# Patient Record
Sex: FEMALE | Race: WHITE | Employment: FULL TIME | URBAN - METROPOLITAN AREA
[De-identification: names, ages, dates, MRNs, and addresses within clinical notes are randomized per-mention and may not be internally consistent; named-entity substitution may affect disease eponyms.]

---

## 2021-04-09 ENCOUNTER — OFFICE VISIT (OUTPATIENT)
Dept: FAMILY MEDICINE CLINIC | Age: 56
End: 2021-04-09
Payer: MEDICAID

## 2021-04-09 ENCOUNTER — TELEPHONE (OUTPATIENT)
Dept: FAMILY MEDICINE CLINIC | Age: 56
End: 2021-04-09

## 2021-04-09 VITALS
HEIGHT: 65 IN | HEART RATE: 56 BPM | RESPIRATION RATE: 20 BRPM | OXYGEN SATURATION: 97 % | BODY MASS INDEX: 31.79 KG/M2 | SYSTOLIC BLOOD PRESSURE: 112 MMHG | WEIGHT: 190.8 LBS | DIASTOLIC BLOOD PRESSURE: 60 MMHG | TEMPERATURE: 97.8 F

## 2021-04-09 DIAGNOSIS — K21.9 GASTROESOPHAGEAL REFLUX DISEASE WITHOUT ESOPHAGITIS: ICD-10-CM

## 2021-04-09 DIAGNOSIS — F41.1 GAD (GENERALIZED ANXIETY DISORDER): ICD-10-CM

## 2021-04-09 DIAGNOSIS — G56.02 LEFT CARPAL TUNNEL SYNDROME: ICD-10-CM

## 2021-04-09 DIAGNOSIS — R10.84 GENERALIZED ABDOMINAL PAIN: ICD-10-CM

## 2021-04-09 DIAGNOSIS — Z12.11 COLON CANCER SCREENING: ICD-10-CM

## 2021-04-09 DIAGNOSIS — Z13.1 DIABETES MELLITUS SCREENING: ICD-10-CM

## 2021-04-09 DIAGNOSIS — Z12.31 ENCOUNTER FOR SCREENING MAMMOGRAM FOR BREAST CANCER: Primary | ICD-10-CM

## 2021-04-09 DIAGNOSIS — Z11.59 NEED FOR HEPATITIS C SCREENING TEST: ICD-10-CM

## 2021-04-09 DIAGNOSIS — K58.8 OTHER IRRITABLE BOWEL SYNDROME: Primary | ICD-10-CM

## 2021-04-09 DIAGNOSIS — K58.9 IRRITABLE BOWEL SYNDROME WITHOUT DIARRHEA: ICD-10-CM

## 2021-04-09 DIAGNOSIS — E66.09 CLASS 1 OBESITY DUE TO EXCESS CALORIES WITH BODY MASS INDEX (BMI) OF 31.0 TO 31.9 IN ADULT, UNSPECIFIED WHETHER SERIOUS COMORBIDITY PRESENT: ICD-10-CM

## 2021-04-09 PROCEDURE — 99204 OFFICE O/P NEW MOD 45 MIN: CPT | Performed by: FAMILY MEDICINE

## 2021-04-09 PROCEDURE — 36415 COLL VENOUS BLD VENIPUNCTURE: CPT | Performed by: FAMILY MEDICINE

## 2021-04-09 RX ORDER — BUSPIRONE HYDROCHLORIDE 10 MG/1
10 TABLET ORAL 3 TIMES DAILY
Qty: 90 TAB | Refills: 2 | Status: SHIPPED | OUTPATIENT
Start: 2021-04-09 | End: 2021-12-13

## 2021-04-09 RX ORDER — DICYCLOMINE HYDROCHLORIDE 10 MG/1
10 CAPSULE ORAL 3 TIMES DAILY
Qty: 90 CAP | Refills: 5 | Status: SHIPPED | OUTPATIENT
Start: 2021-04-09 | End: 2021-12-13

## 2021-04-09 NOTE — PROGRESS NOTES
Rios Lyman is a 64 y.o. female who presents with the following:  Chief Complaint   Patient presents with    Hand Pain     numbness from carpal tunnel syndrome    Abdominal Pain    Anxiety    GERD       Patient has a past history of irritable bowel disease but recently has been having generalized abdominal pain without any constipation without any diarrhea without any fevers chills or sweats awoke without any blood in her stool. The patient has not had a colonoscopy and realizes she needs to have one. The patient does have gastro esophageal reflux without any esophagitis but she has been having more heartburn on and off as of late. She is not on any medication for this. The patient has been having tingling in her left hand when she is sleeping at night and wakes up and has to shake it to get it to stop tingling. Patient also has a long history of generalized anxiety that goes back to her teens and she has not been on any medication for this and does seem to notice that her abdominal pain is worse when she is really anxious and her current job puts a lot of stress on her. Not on File    No current outpatient medications on file. No current facility-administered medications for this visit. No past medical history on file. No past surgical history on file. Family History   Problem Relation Age of Onset    Cancer Mother     Diabetes Mother     Cancer Father     Diabetes Father        Social History     Socioeconomic History    Marital status:      Spouse name: Not on file    Number of children: Not on file    Years of education: Not on file    Highest education level: Not on file   Tobacco Use    Smoking status: Never Smoker    Smokeless tobacco: Never Used       Review of Systems   Constitutional: Positive for weight loss. Negative for chills, fever and malaise/fatigue. HENT: Negative for congestion, hearing loss, sore throat and tinnitus.     Eyes: Negative for blurred vision, pain and discharge. Respiratory: Negative for cough, shortness of breath and wheezing. Cardiovascular: Negative for chest pain, palpitations, orthopnea, claudication and leg swelling. Gastrointestinal: Positive for heartburn. Negative for abdominal pain and constipation. Genitourinary: Negative for dysuria, frequency and urgency. Musculoskeletal: Negative for falls, joint pain and myalgias. Skin: Negative for itching and rash. Neurological: Positive for tingling. Negative for dizziness, tremors and headaches. Endo/Heme/Allergies: Negative for environmental allergies and polydipsia. Psychiatric/Behavioral: Negative for depression and substance abuse. The patient is nervous/anxious. Visit Vitals  /60   Pulse (!) 56   Temp 97.8 °F (36.6 °C)   Resp 20   Ht 5' 5\" (1.651 m)   Wt 190 lb 12.8 oz (86.5 kg)   SpO2 97%   BMI 31.75 kg/m²     Physical Exam  Vitals signs reviewed. Constitutional:       General: She is not in acute distress. Appearance: Normal appearance. She is obese. She is not ill-appearing. HENT:      Head: Normocephalic and atraumatic. Right Ear: Tympanic membrane, ear canal and external ear normal.      Left Ear: Tympanic membrane, ear canal and external ear normal.      Nose: Nose normal. No congestion or rhinorrhea. Mouth/Throat:      Mouth: Mucous membranes are moist.      Pharynx: No posterior oropharyngeal erythema. Eyes:      General:         Right eye: No discharge. Left eye: No discharge. Extraocular Movements: Extraocular movements intact. Conjunctiva/sclera: Conjunctivae normal.      Pupils: Pupils are equal, round, and reactive to light. Comments: Cornea anterior chamber and iris are normal.   Neck:      Musculoskeletal: Normal range of motion and neck supple. Trachea: No tracheal deviation. Cardiovascular:      Rate and Rhythm: Normal rate and regular rhythm. Pulses: Normal pulses.       Heart sounds: Normal heart sounds. No murmur. No friction rub. No gallop. Pulmonary:      Effort: Pulmonary effort is normal. No respiratory distress. Breath sounds: Normal breath sounds. No wheezing or rhonchi. Chest:      Chest wall: No tenderness. Abdominal:      General: Bowel sounds are normal. There is no distension. Palpations: Abdomen is soft. There is no mass. Tenderness: There is no abdominal tenderness. There is no guarding or rebound. Musculoskeletal: Normal range of motion. General: No tenderness or deformity. Right lower leg: No edema. Left lower leg: No edema. Lymphadenopathy:      Cervical: No cervical adenopathy. Skin:     General: Skin is warm and dry. Coloration: Skin is not pale. Findings: No erythema or rash. Neurological:      General: No focal deficit present. Mental Status: She is alert and oriented to person, place, and time. Cranial Nerves: No cranial nerve deficit. Motor: No abnormal muscle tone. Deep Tendon Reflexes: Reflexes are normal and symmetric. Reflexes normal.      Comments: Cranial nerves II through XII are intact sensory and motor. Biceps triceps knee and ankle DTRs are normal and symmetrical.   Psychiatric:         Behavior: Behavior normal.         Thought Content: Thought content normal.         Judgment: Judgment normal.      Comments: Patient's mood is somber. ICD-10-CM ICD-9-CM    1. Encounter for screening mammogram for breast cancer  Z12.31 V76.12 [facility] 3D ABISAI W MAMMO BI SCREENING INCL CAD   2. Colon cancer screening  Z12.11 V76.51 REFERRAL TO GENERAL SURGERY   3. KEY (generalized anxiety disorder)  F41.1 300.02    4. Left carpal tunnel syndrome  G56.02 354.0    5. Generalized abdominal pain  R10.84 789.07 CBC WITH AUTOMATED DIFF      METABOLIC PANEL, COMPREHENSIVE      LIPID PANEL      SED RATE (ESR)   6. Diabetes mellitus screening  Z13.1 V77.1    7.  Gastroesophageal reflux disease without esophagitis  K21.9 530.81    8. Irritable bowel syndrome without diarrhea  K58.9 564.1    9. Class 1 obesity due to excess calories with body mass index (BMI) of 31.0 to 31.9 in adult, unspecified whether serious comorbidity present  E66.09 278.00     Z68.31 V85.31        Orders Placed This Encounter    [facility] 3D ABISAI W MAMMO BI SCREENING INCL CAD     Standing Status:   Future     Standing Expiration Date:   7/9/2021    CBC WITH AUTOMATED DIFF     Standing Status:   Future     Standing Expiration Date:   7/3/3872    METABOLIC PANEL, COMPREHENSIVE     Standing Status:   Future     Standing Expiration Date:   5/9/2021    LIPID PANEL     Standing Status:   Future     Standing Expiration Date:   5/9/2021    SED RATE (ESR)     Standing Status:   Future     Standing Expiration Date:   4/9/2022   [facility] General [facility]     Referral Priority:   Routine     Referral Type:   Consultation     Referral Reason:   Specialty Services Required     Referred to Provider:   Harrison Jones MD     Number of Visits Requested:   1       Follow-up and Dispositions    · Return in about 4 weeks (around 5/7/2021), or if symptoms worsen or fail to improve.          Imani Roa MD

## 2021-04-09 NOTE — TELEPHONE ENCOUNTER
Patient is wanting to know what meds were prescribed for her today but I do not see that anything was prescribed.   Please confirm

## 2021-04-09 NOTE — TELEPHONE ENCOUNTER
----- Message from John Guerrero sent at 4/9/2021  3:19 PM EDT -----  Regarding: Dr Vidales/Telephone  General Message/Vendor Calls    Caller's first and last name: N/A      Reason for call: Pt would like to confirm what medications she was prescribed at her appt today      Callback required yes/no and why: yes, to confirm what medications she was prescribed at her appt today so she can pick them up      Best contact number(s): [phone]      Details to clarify the request: Pt would like to confirm what medications she was prescribed at her appt today 4/9 so she can pick them up from the pharmacy.  Pt would like a call back confirmation today so she can know when she can  her medication      John Guerrero

## 2021-04-09 NOTE — PROGRESS NOTES
1. Have you been to the ER, urgent care clinic since your last visit? Hospitalized since your last visit?no    2. Have you seen or consulted any other health care providers outside of the [address] [place] [address] since your last visit? Include any pap smears or colon screening.  no

## 2021-04-10 LAB
ALBUMIN SERPL-MCNC: 4 G/DL (ref 3.5–5)
ALBUMIN/GLOB SERPL: 1.1 {RATIO} (ref 1.1–2.2)
ALP SERPL-CCNC: 132 U/L (ref 45–117)
ALT SERPL-CCNC: 27 U/L (ref 12–78)
ANION GAP SERPL CALC-SCNC: 3 MMOL/L (ref 5–15)
AST SERPL-CCNC: 13 U/L (ref 15–37)
BASOPHILS # BLD: 0.1 K/UL (ref 0–0.1)
BASOPHILS NFR BLD: 1 % (ref 0–1)
BILIRUB SERPL-MCNC: 0.5 MG/DL (ref 0.2–1)
BUN SERPL-MCNC: 19 MG/DL (ref 6–20)
BUN/CREAT SERPL: 22 (ref 12–20)
CALCIUM SERPL-MCNC: 9.7 MG/DL (ref 8.5–10.1)
CHLORIDE SERPL-SCNC: 108 MMOL/L (ref 97–108)
CHOLEST SERPL-MCNC: 240 MG/DL
CO2 SERPL-SCNC: 30 MMOL/L (ref 21–32)
CREAT SERPL-MCNC: 0.88 MG/DL (ref 0.55–1.02)
DIFFERENTIAL METHOD BLD: ABNORMAL
EOSINOPHIL # BLD: 0.1 K/UL (ref 0–0.4)
EOSINOPHIL NFR BLD: 1 % (ref 0–7)
ERYTHROCYTE [DISTWIDTH] IN BLOOD BY AUTOMATED COUNT: 12.2 % (ref 11.5–14.5)
ERYTHROCYTE [SEDIMENTATION RATE] IN BLOOD: 24 MM/HR (ref 0–30)
GLOBULIN SER CALC-MCNC: 3.7 G/DL (ref 2–4)
GLUCOSE SERPL-MCNC: 92 MG/DL (ref 65–100)
HCT VFR BLD AUTO: 48.3 % (ref 35–47)
HDLC SERPL-MCNC: 47 MG/DL
HDLC SERPL: 5.1 {RATIO} (ref 0–5)
HGB BLD-MCNC: 15.5 G/DL (ref 11.5–16)
IMM GRANULOCYTES # BLD AUTO: 0 K/UL (ref 0–0.04)
IMM GRANULOCYTES NFR BLD AUTO: 0 % (ref 0–0.5)
LDLC SERPL CALC-MCNC: 158.6 MG/DL (ref 0–100)
LIPID PROFILE,FLP: ABNORMAL
LYMPHOCYTES # BLD: 1.5 K/UL (ref 0.8–3.5)
LYMPHOCYTES NFR BLD: 14 % (ref 12–49)
MCH RBC QN AUTO: 30.9 PG (ref 26–34)
MCHC RBC AUTO-ENTMCNC: 32.1 G/DL (ref 30–36.5)
MCV RBC AUTO: 96.2 FL (ref 80–99)
MONOCYTES # BLD: 0.4 K/UL (ref 0–1)
MONOCYTES NFR BLD: 4 % (ref 5–13)
NEUTS SEG # BLD: 8.9 K/UL (ref 1.8–8)
NEUTS SEG NFR BLD: 80 % (ref 32–75)
NRBC # BLD: 0 K/UL (ref 0–0.01)
NRBC BLD-RTO: 0 PER 100 WBC
PLATELET # BLD AUTO: 238 K/UL (ref 150–400)
PMV BLD AUTO: 11.6 FL (ref 8.9–12.9)
POTASSIUM SERPL-SCNC: 5 MMOL/L (ref 3.5–5.1)
PROT SERPL-MCNC: 7.7 G/DL (ref 6.4–8.2)
RBC # BLD AUTO: 5.02 M/UL (ref 3.8–5.2)
SODIUM SERPL-SCNC: 141 MMOL/L (ref 136–145)
TRIGL SERPL-MCNC: 172 MG/DL (ref ?–150)
VLDLC SERPL CALC-MCNC: 34.4 MG/DL
WBC # BLD AUTO: 11 K/UL (ref 3.6–11)

## 2021-04-12 LAB
HCV AB SERPL QL IA: NONREACTIVE
HCV COMMENT,HCGAC: NORMAL

## 2021-04-27 ENCOUNTER — OFFICE VISIT (OUTPATIENT)
Dept: FAMILY MEDICINE CLINIC | Age: 56
End: 2021-04-27
Payer: MEDICAID

## 2021-04-27 ENCOUNTER — HOSPITAL ENCOUNTER (OUTPATIENT)
Dept: LAB | Age: 56
Discharge: HOME | End: 2021-04-27

## 2021-04-27 VITALS
BODY MASS INDEX: 31.05 KG/M2 | HEIGHT: 65 IN | HEART RATE: 55 BPM | WEIGHT: 186.38 LBS | RESPIRATION RATE: 18 BRPM | OXYGEN SATURATION: 98 % | DIASTOLIC BLOOD PRESSURE: 52 MMHG | SYSTOLIC BLOOD PRESSURE: 102 MMHG

## 2021-04-27 DIAGNOSIS — Z01.419 ENCOUNTER FOR ANNUAL ROUTINE GYNECOLOGICAL EXAMINATION: Primary | ICD-10-CM

## 2021-04-27 PROCEDURE — 99396 PREV VISIT EST AGE 40-64: CPT | Performed by: FAMILY MEDICINE

## 2021-04-27 NOTE — PROGRESS NOTES
1. Have you been to the ER, urgent care clinic since your last visit? Hospitalized since your last visit? No    2. Have you seen or consulted any other health care providers outside of the [address] [place] [address] since your last visit? Include any pap smears or colon screening.  No     Chief Complaint   Patient presents with    Gyn Exam     Visit Vitals  BP (!) 102/52 (BP 1 Location: Left arm, BP Patient Position: Sitting)   Pulse (!) 55   Resp 18   Ht 5' 5\" (1.651 m)   Wt 186 lb 6 oz (84.5 kg)   LMP  (LMP Unknown)   SpO2 98%   BMI 31.01 kg/m²

## 2021-04-27 NOTE — PROGRESS NOTES
Vanesa Coker is a 64 y.o. female who presents with the following:  Chief Complaint   Patient presents with   [place] Exam       Patient is in today for well woman examination and is not complaining of any particular problems right now. He states that her irritable bowel is doing much better on the dicyclomine and her anxiety is much better on the buspirone. She has a second Covid shot today and has a colonoscopy lined up and her mammogram lined up. Allergies   Allergen Reactions    Other Medication Unknown (comments)     Horse serum    Sulfa (Sulfonamide Antibiotics) Other (comments)     Patient states felt \"very sick and had high fever\"       Current Outpatient Medications   Medication Sig    dicyclomine (BENTYL) 10 mg capsule Take 1 Cap by mouth three (3) times daily. Indications: irritable colon    busPIRone (BUSPAR) 10 mg tablet Take 1 Tab by mouth three (3) times daily. No current facility-administered medications for this visit. No past medical history on file. No past surgical history on file. Family History   Problem Relation Age of Onset    Cancer Mother     Diabetes Mother     Cancer Father     Diabetes Father        Social History     Socioeconomic History    Marital status:      Spouse name: Not on file    Number of children: Not on file    Years of education: Not on file    Highest education level: Not on file   Tobacco Use    Smoking status: Never Smoker    Smokeless tobacco: Never Used       Review of Systems   Constitutional: Negative for chills, fever, malaise/fatigue and weight loss. HENT: Negative for congestion, hearing loss, sore throat and tinnitus. Eyes: Negative for blurred vision, pain and discharge. Respiratory: Negative for cough, shortness of breath and wheezing. Cardiovascular: Negative for chest pain, palpitations, orthopnea, claudication and leg swelling. Gastrointestinal: Negative for abdominal pain, constipation and heartburn. Genitourinary: Negative for dysuria, frequency and urgency. Musculoskeletal: Negative for falls, joint pain and myalgias. Skin: Negative for itching and rash. Neurological: Negative for dizziness, tingling, tremors and headaches. Endo/Heme/Allergies: Negative for environmental allergies and polydipsia. Psychiatric/Behavioral: Negative for depression and substance abuse. The patient is not nervous/anxious. Visit Vitals  BP (!) 102/52 (BP 1 Location: Left arm, BP Patient Position: Sitting)   Pulse (!) 55   Resp 18   Ht 5' 5\" (1.651 m)   Wt 186 lb 6 oz (84.5 kg)   LMP  (LMP Unknown)   SpO2 98%   BMI 31.01 kg/m²     Physical Exam  Vitals signs reviewed. Exam conducted with a chaperone present. Constitutional:       General: She is not in acute distress. Appearance: Normal appearance. She is obese. She is not ill-appearing. HENT:      Head: Normocephalic and atraumatic. Right Ear: Tympanic membrane, ear canal and external ear normal.      Left Ear: Tympanic membrane, ear canal and external ear normal.      Nose: Nose normal. No congestion or rhinorrhea. Mouth/Throat:      Mouth: Mucous membranes are moist.      Pharynx: No posterior oropharyngeal erythema. Eyes:      General:         Right eye: No discharge. Left eye: No discharge. Extraocular Movements: Extraocular movements intact. Conjunctiva/sclera: Conjunctivae normal.      Pupils: Pupils are equal, round, and reactive to light. Comments: Cornea anterior chamber and iris are normal.   Neck:      Musculoskeletal: Normal range of motion and neck supple. Trachea: No tracheal deviation. Cardiovascular:      Rate and Rhythm: Normal rate and regular rhythm. Pulses: Normal pulses. Heart sounds: Normal heart sounds. No murmur. No friction rub. No gallop. Pulmonary:      Effort: Pulmonary effort is normal. No respiratory distress. Breath sounds: Normal breath sounds.  No wheezing or rhonchi. Chest:      Chest wall: No tenderness. Abdominal:      General: Bowel sounds are normal. There is no distension. Palpations: Abdomen is soft. There is no mass. Tenderness: There is no abdominal tenderness. There is no guarding or rebound. Genitourinary:     General: Normal vulva. Vagina: No vaginal discharge. Rectum: Normal.      Comments: Patient's external genitalia were normal with a small inclusion cyst on the left labia which is asymptomatic. Patient's introitus was normal bladder urethra [place]'s were normal patient's vagina was normal and the cervix appeared to be normal and a Pap smear was completed and the uterus was found to be normal size anterior adnexa were free from any masses. Rectovaginal exam was normal the anus and rectum were normal.  Patient did have an anterior sentinel tag. The patient's breast examinations were normal bilaterally there were no masses no tenderness and no discharge and lymph nodes in the axillary area in the supraclavicular and cervical areas were normal as well as is the inguinal lymph nodes were normal.  Musculoskeletal:         General: No tenderness or deformity. Right lower leg: No edema. Left lower leg: No edema. Lymphadenopathy:      Cervical: No cervical adenopathy. Skin:     General: Skin is warm and dry. Coloration: Skin is not pale. Findings: No erythema or rash. Neurological:      General: No focal deficit present. Mental Status: She is alert and oriented to person, place, and time. Cranial Nerves: No cranial nerve deficit. Motor: No abnormal muscle tone. Deep Tendon Reflexes: Reflexes are normal and symmetric. Reflexes normal.      Comments: Cranial nerves II through XII are intact sensory and motor. Biceps triceps knee and ankle DTRs are normal and symmetrical.   Psychiatric:         Mood and Affect: Mood normal.         Behavior: Behavior normal.         Thought Content:  Thought content normal.         Judgment: Judgment normal.           ICD-10-CM ICD-9-CM    1. Encounter for annual routine gynecological examination  Z01.419 V72.31 PAP (IMAGE GUIDED) AND CHLAMYDIA/GC       Orders Placed This Encounter    PAP (IMAGE GUIDED) AND CHLAMYDIA/GC     Standing Status:   Future     Standing Expiration Date:   4/27/2022     Order Specific Question:   Pap Source? Answer:   Cervical     Order Specific Question:   Total Hysterectomy? Answer:   No     Order Specific Question:   Supracervical Hysterectomy? Answer:   No     Order Specific Question:   Post Menopausal?     Answer:   Yes     Order Specific Question:   Hormone Therapy? Answer:   No     Order Specific Question:   IUD? Answer:   No     Order Specific Question:   Abnormal Bleeding? Answer:   No     Order Specific Question:   Pregnant     Answer:   No     Order Specific Question:   Post Partum? Answer:   No     Order Specific Question:   Pap collection method? Answer:   broom       Follow-up and Dispositions    · Return in about 3 years (around 4/27/2024).          Chriss Dorantes MD

## 2021-05-13 ENCOUNTER — OFFICE VISIT (OUTPATIENT)
Dept: FAMILY MEDICINE CLINIC | Age: 56
End: 2021-05-13
Payer: MEDICAID

## 2021-05-13 VITALS
DIASTOLIC BLOOD PRESSURE: 72 MMHG | WEIGHT: 187.5 LBS | HEIGHT: 65 IN | BODY MASS INDEX: 31.24 KG/M2 | RESPIRATION RATE: 18 BRPM | TEMPERATURE: 98.3 F | HEART RATE: 59 BPM | SYSTOLIC BLOOD PRESSURE: 133 MMHG | OXYGEN SATURATION: 97 %

## 2021-05-13 DIAGNOSIS — S46.811A INFRASPINATUS STRAIN, RIGHT, INITIAL ENCOUNTER: Primary | ICD-10-CM

## 2021-05-13 DIAGNOSIS — T88.7XXA MEDICATION SIDE EFFECT: ICD-10-CM

## 2021-05-13 PROCEDURE — 99213 OFFICE O/P EST LOW 20 MIN: CPT | Performed by: FAMILY MEDICINE

## 2021-05-13 RX ORDER — NAPROXEN 500 MG/1
500 TABLET ORAL 2 TIMES DAILY WITH MEALS
Qty: 60 TAB | Refills: 1 | Status: SHIPPED | OUTPATIENT
Start: 2021-05-13 | End: 2021-08-23 | Stop reason: SDUPTHER

## 2021-05-13 RX ORDER — CHLORZOXAZONE 500 MG/1
500 TABLET ORAL
Qty: 60 TAB | Refills: 2 | Status: SHIPPED | OUTPATIENT
Start: 2021-05-13 | End: 2021-08-13

## 2021-05-13 RX ORDER — OMEPRAZOLE 40 MG/1
40 CAPSULE, DELAYED RELEASE ORAL DAILY
Qty: 30 CAP | Refills: 1 | Status: SHIPPED | OUTPATIENT
Start: 2021-05-13 | End: 2021-08-13

## 2021-05-13 NOTE — PROGRESS NOTES
1. Have you been to the ER, urgent care clinic since your last visit? Hospitalized since your last visit? No    2. Have you seen or consulted any other health care providers outside of the [address] [place] [address] since your last visit? Include any pap smears or colon screening.  No     Chief Complaint   Patient presents with    Shoulder Pain     Visit Vitals  /72 (BP 1 Location: Left arm, BP Patient Position: Sitting)   Pulse (!) 59   Temp 98.3 °F (36.8 °C) (Temporal)   Resp 18   Ht 5' 5\" (1.651 m)   Wt 187 lb 8 oz (85 kg)   LMP  (LMP Unknown)   SpO2 97%   BMI 31.20 kg/m²

## 2021-05-13 NOTE — PROGRESS NOTES
Belkis Mora is a 64 y.o. female who presents with the following:  Chief Complaint   Patient presents with    Shoulder Pain       Patient has a 2-week history of pain in the vicinity of the right infraspinatus muscle. Patient does a great deal of repetitive work at her employment. Allergies   Allergen Reactions    Other Medication Unknown (comments)     Horse serum    Sulfa (Sulfonamide Antibiotics) Other (comments)     Patient states felt \"very sick and had high fever\"       Current Outpatient Medications   Medication Sig    naproxen (NAPROSYN) 500 mg tablet Take 1 Tab by mouth two (2) times daily (with meals).  chlorzoxazone (PARAFON FORTE) 500 mg tablet Take 1 Tab by mouth four (4) times daily as needed for Muscle Spasm(s).  omeprazole (PRILOSEC) 40 mg capsule Take 1 Cap by mouth daily.  dicyclomine (BENTYL) 10 mg capsule Take 1 Cap by mouth three (3) times daily. Indications: irritable colon    busPIRone (BUSPAR) 10 mg tablet Take 1 Tab by mouth three (3) times daily. No current facility-administered medications for this visit. History reviewed. No pertinent past medical history. History reviewed. No pertinent surgical history. Family History   Problem Relation Age of Onset    Cancer Mother     Diabetes Mother     Cancer Father     Diabetes Father        Social History     Socioeconomic History    Marital status:      Spouse name: Not on file    Number of children: Not on file    Years of education: Not on file    Highest education level: Not on file   Tobacco Use    Smoking status: Never Smoker    Smokeless tobacco: Never Used       Review of Systems   Constitutional: Negative for chills, fever, malaise/fatigue and weight loss. HENT: Negative for congestion, hearing loss, sore throat and tinnitus. Eyes: Negative for blurred vision, pain and discharge. Respiratory: Negative for cough, shortness of breath and wheezing.     Cardiovascular: Negative for chest pain, palpitations, orthopnea, claudication and leg swelling. Gastrointestinal: Negative for abdominal pain, constipation and heartburn. Her gait is a bit upset today when she tried a new nondairy product and she thinks she overdid it. Genitourinary: Negative for dysuria, frequency and urgency. Musculoskeletal: Positive for back pain (In the right shoulder girdle). Negative for falls, joint pain and myalgias. Skin: Negative for itching and rash. Neurological: Negative for dizziness, tingling, tremors and headaches. Endo/Heme/Allergies: Negative for environmental allergies and polydipsia. Psychiatric/Behavioral: Negative for depression and substance abuse. The patient is not nervous/anxious. Visit Vitals  /72 (BP 1 Location: Left arm, BP Patient Position: Sitting)   Pulse (!) 59   Temp 98.3 °F (36.8 °C) (Temporal)   Resp 18   Ht 5' 5\" (1.651 m)   Wt 187 lb 8 oz (85 kg)   LMP  (LMP Unknown)   SpO2 97%   BMI 31.20 kg/m²     Physical Exam  Constitutional:       Appearance: Normal appearance. HENT:      Head: Normocephalic and atraumatic. Right Ear: Tympanic membrane, ear canal and external ear normal.      Left Ear: Tympanic membrane, ear canal and external ear normal.      Nose: Nose normal. No congestion or rhinorrhea. Mouth/Throat:      Mouth: Mucous membranes are moist.      Pharynx: No posterior oropharyngeal erythema. Eyes:      General:         Right eye: No discharge. Left eye: No discharge. Extraocular Movements: Extraocular movements intact. Conjunctiva/sclera: Conjunctivae normal.      Pupils: Pupils are equal, round, and reactive to light. Comments: Cornea anterior chamber and iris are normal.   Neck:      Musculoskeletal: Normal range of motion and neck supple. Trachea: No tracheal deviation. Cardiovascular:      Rate and Rhythm: Normal rate and regular rhythm. Pulses: Normal pulses.       Heart sounds: Normal heart sounds. No murmur. No friction rub. No gallop. Pulmonary:      Effort: Pulmonary effort is normal. No respiratory distress. Breath sounds: Normal breath sounds. No wheezing or rhonchi. Chest:      Chest wall: No tenderness. Abdominal:      General: Bowel sounds are normal. There is no distension. Palpations: Abdomen is soft. There is no mass. Tenderness: There is no abdominal tenderness. There is no guarding or rebound. Musculoskeletal: Normal range of motion. General: Tenderness (Tenderness and some muscle spasm palpable in the right infraspinatus muscle) present. No deformity. Lymphadenopathy:      Cervical: No cervical adenopathy. Skin:     General: Skin is warm and dry. Coloration: Skin is not pale. Findings: No erythema or rash. Neurological:      General: No focal deficit present. Mental Status: She is alert and oriented to person, place, and time. Cranial Nerves: No cranial nerve deficit. Motor: No abnormal muscle tone. Deep Tendon Reflexes: Reflexes are normal and symmetric. Reflexes normal.      Comments: Cranial nerves II through XII are intact sensory and motor. Biceps triceps knee and ankle DTRs are normal and symmetrical.   Psychiatric:         Mood and Affect: Mood normal.         Behavior: Behavior normal.           ICD-10-CM ICD-9-CM    1. Infraspinatus strain, right, initial encounter  S46.811A 840.3 naproxen (NAPROSYN) 500 mg tablet      chlorzoxazone (PARAFON FORTE) 500 mg tablet   2. Medication side effect  T88. 7XXA 995.20 omeprazole (PRILOSEC) 40 mg capsule       Orders Placed This Encounter    naproxen (NAPROSYN) 500 mg tablet     Sig: Take 1 Tab by mouth two (2) times daily (with meals). Dispense:  60 Tab     Refill:  1    chlorzoxazone (PARAFON FORTE) 500 mg tablet     Sig: Take 1 Tab by mouth four (4) times daily as needed for Muscle Spasm(s).      Dispense:  60 Tab     Refill:  2    omeprazole (PRILOSEC) 40 mg capsule     Sig: Take 1 Cap by mouth daily. Dispense:  30 Cap     Refill:  1       Follow-up and Dispositions    · Return if symptoms worsen or fail to improve. Consider physical therapy if medications do not resolve this in a reasonable amount of time.     Allie Allan MD

## 2021-06-01 ENCOUNTER — HOSPITAL ENCOUNTER (OUTPATIENT)
Dept: MAMMOGRAPHY | Age: 56
Discharge: HOME | End: 2021-06-01
Attending: FAMILY MEDICINE
Payer: MEDICAID

## 2021-06-01 DIAGNOSIS — Z12.31 ENCOUNTER FOR SCREENING MAMMOGRAM FOR BREAST CANCER: ICD-10-CM

## 2021-06-01 DIAGNOSIS — R92.8 ABNORMALITY OF LEFT BREAST ON SCREENING MAMMOGRAM: Primary | ICD-10-CM

## 2021-06-01 PROCEDURE — 77063 BREAST TOMOSYNTHESIS BI: CPT

## 2021-06-02 DIAGNOSIS — R92.8 ABNORMAL MAMMOGRAM: Primary | ICD-10-CM

## 2021-06-09 ENCOUNTER — HOSPITAL ENCOUNTER (OUTPATIENT)
Dept: ULTRASOUND IMAGING | Age: 56
Discharge: HOME | End: 2021-06-09
Attending: FAMILY MEDICINE
Payer: MEDICAID

## 2021-06-09 ENCOUNTER — HOSPITAL ENCOUNTER (OUTPATIENT)
Dept: MAMMOGRAPHY | Age: 56
Discharge: HOME | End: 2021-06-09
Attending: FAMILY MEDICINE
Payer: MEDICAID

## 2021-06-09 DIAGNOSIS — R92.8 ABNORMAL MAMMOGRAM: ICD-10-CM

## 2021-06-09 DIAGNOSIS — R92.8 ABNORMALITY OF LEFT BREAST ON SCREENING MAMMOGRAM: ICD-10-CM

## 2021-06-09 PROCEDURE — 77065 DX MAMMO INCL CAD UNI: CPT

## 2021-07-01 ENCOUNTER — OFFICE VISIT (OUTPATIENT)
Dept: FAMILY MEDICINE CLINIC | Age: 56
End: 2021-07-01

## 2021-07-01 ENCOUNTER — OFFICE VISIT (OUTPATIENT)
Dept: SURGERY | Age: 56
End: 2021-07-01

## 2021-07-01 VITALS
BODY MASS INDEX: 32.22 KG/M2 | HEART RATE: 60 BPM | WEIGHT: 193.4 LBS | DIASTOLIC BLOOD PRESSURE: 55 MMHG | HEIGHT: 65 IN | SYSTOLIC BLOOD PRESSURE: 109 MMHG | OXYGEN SATURATION: 98 % | RESPIRATION RATE: 18 BRPM

## 2021-07-01 VITALS
HEIGHT: 65 IN | TEMPERATURE: 98.6 F | OXYGEN SATURATION: 98 % | WEIGHT: 193.4 LBS | RESPIRATION RATE: 18 BRPM | SYSTOLIC BLOOD PRESSURE: 119 MMHG | BODY MASS INDEX: 32.22 KG/M2 | HEART RATE: 58 BPM | DIASTOLIC BLOOD PRESSURE: 79 MMHG

## 2021-07-01 DIAGNOSIS — R42 VERTIGO: Primary | ICD-10-CM

## 2021-07-01 DIAGNOSIS — Z12.11 ENCOUNTER FOR SCREENING COLONOSCOPY: Primary | ICD-10-CM

## 2021-07-01 PROCEDURE — 99213 OFFICE O/P EST LOW 20 MIN: CPT | Performed by: FAMILY MEDICINE

## 2021-07-01 RX ORDER — MECLIZINE HYDROCHLORIDE 25 MG/1
25 TABLET ORAL
Qty: 90 TABLET | Refills: 5 | Status: SHIPPED | OUTPATIENT
Start: 2021-07-01 | End: 2021-07-11

## 2021-07-01 RX ORDER — FLUTICASONE PROPIONATE 50 MCG
SPRAY, SUSPENSION (ML) NASAL
Qty: 1 BOTTLE | Refills: 5 | Status: SHIPPED | OUTPATIENT
Start: 2021-07-01 | End: 2021-12-15 | Stop reason: ALTCHOICE

## 2021-07-01 NOTE — PROGRESS NOTES
Sebastien Iverson is a 64 y.o. female who presents with the following:  Chief Complaint   Patient presents with    Immunization/Injection    Dizziness     with nausea       Patient would like to have a Shingrix vaccine and I told her she could get it at her pharmacy because of her insurance. Patient also has been having trouble with dizziness with nausea and she describes it as being unstable as far as her balance and it feels like the room is moving. Allergies   Allergen Reactions    Other Medication Unknown (comments)     Horse serum    Sulfa (Sulfonamide Antibiotics) Other (comments)     Patient states felt \"very sick and had high fever\"       Current Outpatient Medications   Medication Sig    meclizine (ANTIVERT) 25 mg tablet Take 1 Tablet by mouth three (3) times daily as needed for Dizziness for up to 10 days. Indications: sensation of spinning or whirling    fluticasone propionate (FLONASE) 50 mcg/actuation nasal spray 1 puff twice daily each nostril    naproxen (NAPROSYN) 500 mg tablet Take 1 Tab by mouth two (2) times daily (with meals).  chlorzoxazone (PARAFON FORTE) 500 mg tablet Take 1 Tab by mouth four (4) times daily as needed for Muscle Spasm(s).  omeprazole (PRILOSEC) 40 mg capsule Take 1 Cap by mouth daily.  dicyclomine (BENTYL) 10 mg capsule Take 1 Cap by mouth three (3) times daily. Indications: irritable colon    busPIRone (BUSPAR) 10 mg tablet Take 1 Tab by mouth three (3) times daily. No current facility-administered medications for this visit. No past medical history on file. No past surgical history on file.     Family History   Problem Relation Age of Onset    Cancer Mother     Diabetes Mother     Cancer Father     Diabetes Father     Breast Cancer Sister        Social History     Socioeconomic History    Marital status:      Spouse name: Not on file    Number of children: Not on file    Years of education: Not on file    Highest education level: Not on file   Tobacco Use    Smoking status: Never Smoker    Smokeless tobacco: Never Used     Social Determinants of Health     Financial Resource Strain:     Difficulty of Paying Living Expenses:    Food Insecurity:     Worried About Running Out of Food in the Last Year:     Ran Out of Food in the Last Year:    Transportation Needs:     Lack of Transportation (Medical):  Lack of Transportation (Non-Medical):    Physical Activity:     Days of Exercise per Week:     Minutes of Exercise per Session:    Stress:     Feeling of Stress :    Social Connections:     Frequency of Communication with Friends and Family:     Frequency of Social Gatherings with Friends and Family:     Attends Scientologist Services:     Active Member of Clubs or Organizations:     Attends Club or Organization Meetings:     Marital Status:        Review of Systems   Constitutional: Negative for chills, fever, malaise/fatigue and weight loss. HENT: Negative for congestion, hearing loss, sore throat and tinnitus. Eyes: Negative for blurred vision, pain and discharge. Respiratory: Negative for cough, shortness of breath and wheezing. Cardiovascular: Negative for chest pain, palpitations, orthopnea, claudication and leg swelling. Gastrointestinal: Positive for nausea. Negative for abdominal pain, constipation and heartburn. Genitourinary: Negative for dysuria, frequency and urgency. Musculoskeletal: Negative for falls, joint pain and myalgias. Skin: Negative for itching and rash. Neurological: Positive for dizziness. Negative for tingling, tremors and headaches. Endo/Heme/Allergies: Negative for environmental allergies and polydipsia. Psychiatric/Behavioral: Negative for depression and substance abuse. The patient is not nervous/anxious.         Visit Vitals  /79 (BP 1 Location: Left arm)   Pulse (!) 58   Temp 98.6 °F (37 °C)   Resp 18   Ht 5' 5\" (1.651 m)   Wt 193 lb 6.4 oz (87.7 kg)   SpO2 98% BMI 32.18 kg/m²     Physical Exam  Vitals reviewed. Constitutional:       General: She is not in acute distress. Appearance: Normal appearance. She is obese. She is not ill-appearing. HENT:      Head: Normocephalic and atraumatic. Right Ear: Tympanic membrane, ear canal and external ear normal.      Left Ear: Tympanic membrane, ear canal and external ear normal.      Nose: Nose normal. No congestion or rhinorrhea. Mouth/Throat:      Mouth: Mucous membranes are moist.      Pharynx: No posterior oropharyngeal erythema. Eyes:      General:         Right eye: No discharge. Left eye: No discharge. Extraocular Movements: Extraocular movements intact. Conjunctiva/sclera: Conjunctivae normal.      Pupils: Pupils are equal, round, and reactive to light. Comments: Cornea anterior chamber and iris are normal.   Neck:      Trachea: No tracheal deviation. Cardiovascular:      Rate and Rhythm: Normal rate and regular rhythm. Pulses: Normal pulses. Heart sounds: Normal heart sounds. No murmur heard. No friction rub. No gallop. Pulmonary:      Effort: Pulmonary effort is normal. No respiratory distress. Breath sounds: Normal breath sounds. No wheezing or rhonchi. Chest:      Chest wall: No tenderness. Abdominal:      General: Bowel sounds are normal. There is no distension. Palpations: Abdomen is soft. There is no mass. Tenderness: There is no abdominal tenderness. There is no guarding or rebound. Musculoskeletal:         General: No tenderness or deformity. Cervical back: Normal range of motion and neck supple. Lymphadenopathy:      Cervical: No cervical adenopathy. Skin:     General: Skin is warm and dry. Coloration: Skin is not pale. Findings: No erythema or rash. Neurological:      General: No focal deficit present. Mental Status: She is alert and oriented to person, place, and time.       Cranial Nerves: No cranial nerve deficit. Motor: No abnormal muscle tone. Deep Tendon Reflexes: Reflexes are normal and symmetric. Reflexes normal.      Comments: Cranial nerves II through XII are intact sensory and motor. Biceps triceps knee and ankle DTRs are normal and symmetrical.   Psychiatric:         Mood and Affect: Mood normal.         Behavior: Behavior normal.         Thought Content: Thought content normal.         Judgment: Judgment normal.           ICD-10-CM ICD-9-CM    1. Vertigo  R42 780.4 meclizine (ANTIVERT) 25 mg tablet      fluticasone propionate (FLONASE) 50 mcg/actuation nasal spray       Orders Placed This Encounter    meclizine (ANTIVERT) 25 mg tablet     Sig: Take 1 Tablet by mouth three (3) times daily as needed for Dizziness for up to 10 days.  Indications: sensation of spinning or whirling     Dispense:  90 Tablet     Refill:  5    fluticasone propionate (FLONASE) 50 mcg/actuation nasal spray     Si puff twice daily each nostril     Dispense:  1 Bottle     Refill:  5   Avoid salt        Ana Moraes MD

## 2021-07-01 NOTE — PROGRESS NOTES
Subjective:      Jesus Back is an 64 y.o. female who is referred by:  Caitlin Rudolph MD for evaluation for colonoscopy. Indication: screening exam.  Patient has complaint of diarrhea for many years. She believes it started 10-20 years ago. She has between 3-4 non-formed bowel movements per day. It is accompanied at times with non-specific abdominal discomfort. She denies issues with constipation, blood per rectum, pain with bowel movements or weight loss. In-addition to the diarrhea she has been treated on and off for reflux without esophagitis. She denies any prior abdominal surgery, family history of colon ca or IBD. HPI     Patient Active Problem List   Diagnosis Code    Irritable bowel syndrome without diarrhea K58.9    Gastroesophageal reflux disease without esophagitis K21.9    Generalized abdominal pain R10.84    Left carpal tunnel syndrome G56.02    KEY (generalized anxiety disorder) F41.1    Class 1 obesity due to excess calories with body mass index (BMI) of 31.0 to 31.9 in adult E66.09, Z68.31     Patient Active Problem List    Diagnosis Date Noted    Irritable bowel syndrome without diarrhea 04/09/2021    Gastroesophageal reflux disease without esophagitis 04/09/2021    Generalized abdominal pain 04/09/2021    Left carpal tunnel syndrome 04/09/2021    KEY (generalized anxiety disorder) 04/09/2021    Class 1 obesity due to excess calories with body mass index (BMI) of 31.0 to 31.9 in adult 04/09/2021     Current Outpatient Medications   Medication Sig Dispense Refill    meclizine (ANTIVERT) 25 mg tablet Take 1 Tablet by mouth three (3) times daily as needed for Dizziness for up to 10 days. Indications: sensation of spinning or whirling 90 Tablet 5    fluticasone propionate (FLONASE) 50 mcg/actuation nasal spray 1 puff twice daily each nostril 1 Bottle 5    naproxen (NAPROSYN) 500 mg tablet Take 1 Tab by mouth two (2) times daily (with meals).  60 Tab 1    chlorzoxazone (PARAFON FORTE) 500 mg tablet Take 1 Tab by mouth four (4) times daily as needed for Muscle Spasm(s). 60 Tab 2    omeprazole (PRILOSEC) 40 mg capsule Take 1 Cap by mouth daily. 30 Cap 1    dicyclomine (BENTYL) 10 mg capsule Take 1 Cap by mouth three (3) times daily. Indications: irritable colon 90 Cap 5    busPIRone (BUSPAR) 10 mg tablet Take 1 Tab by mouth three (3) times daily. 90 Tab 2     Allergies   Allergen Reactions    Other Medication Unknown (comments)     Horse serum    Sulfa (Sulfonamide Antibiotics) Other (comments)     Patient states felt \"very sick and had high fever\"     History reviewed. No pertinent past medical history. History reviewed. No pertinent surgical history. Family History   Problem Relation Age of Onset    Cancer Mother     Diabetes Mother     Cancer Father     Diabetes Father     Breast Cancer Sister      Social History     Tobacco Use    Smoking status: Never Smoker    Smokeless tobacco: Never Used   Substance Use Topics    Alcohol use: Not on file        Review of Systems  Review of Systems   Constitutional: Negative for chills, fever, malaise/fatigue and weight loss. HENT: Positive for congestion. Negative for sore throat. Respiratory: Negative for cough and shortness of breath. Cardiovascular: Negative for chest pain, palpitations, orthopnea, claudication and leg swelling. Gastrointestinal: Positive for diarrhea and heartburn. Negative for abdominal pain, blood in stool, constipation, nausea and vomiting. Musculoskeletal: Negative for back pain, joint pain and neck pain. Skin: Negative for itching and rash. Neurological: Negative for focal weakness and headaches. Psychiatric/Behavioral: The patient is nervous/anxious.           Objective:     Visit Vitals  BP (!) 109/55 (BP 1 Location: Left upper arm, BP Patient Position: Sitting)   Pulse 60   Resp 18   Ht 5' 5\" (1.651 m)   Wt 193 lb 6.4 oz (87.7 kg)   SpO2 98%   BMI 32.18 kg/m²     Physical Exam  Constitutional:       General: She is not in acute distress. Appearance: Normal appearance. She is obese. She is not ill-appearing or toxic-appearing. HENT:      Head: Normocephalic and atraumatic. Nose: Nose normal. No congestion. Mouth/Throat:      Mouth: Mucous membranes are moist.      Pharynx: Oropharynx is clear. Eyes:      General: No scleral icterus. Cardiovascular:      Rate and Rhythm: Normal rate. Heart sounds: No murmur heard. Pulmonary:      Effort: Pulmonary effort is normal. No respiratory distress. Breath sounds: No wheezing. Abdominal:      General: There is no distension. Palpations: Abdomen is soft. Tenderness: There is no abdominal tenderness. There is no guarding. Musculoskeletal:         General: No swelling. Normal range of motion. Cervical back: Normal range of motion and neck supple. Lymphadenopathy:      Cervical: No cervical adenopathy. Skin:     General: Skin is warm. Coloration: Skin is not jaundiced. Neurological:      General: No focal deficit present. Mental Status: She is alert and oriented to person, place, and time. Physical Exam  Constitutional:       General: She is not in acute distress. Appearance: Normal appearance. She is obese. She is not ill-appearing or toxic-appearing. HENT:      Head: Normocephalic and atraumatic. Nose: Nose normal. No congestion. Mouth/Throat:      Mouth: Mucous membranes are moist.      Pharynx: Oropharynx is clear. Eyes:      General: No scleral icterus. Cardiovascular:      Rate and Rhythm: Normal rate. Heart sounds: No murmur heard. Pulmonary:      Effort: Pulmonary effort is normal. No respiratory distress. Breath sounds: No wheezing. Abdominal:      General: There is no distension. Palpations: Abdomen is soft. Tenderness: There is no abdominal tenderness. There is no guarding.    Musculoskeletal:         General: No swelling. Normal range of motion. Cervical back: Normal range of motion and neck supple. Lymphadenopathy:      Cervical: No cervical adenopathy. Skin:     General: Skin is warm. Coloration: Skin is not jaundiced. Neurological:      General: No focal deficit present. Mental Status: She is alert and oriented to person, place, and time. Assessment/Plan:     Colonoscopy is indicated as noted above and we will proceed to colonoscopy. The risks (bleeding, perforation, spleen injury, etc.) and benefits of my recommendations, as well as other treatment options were discussed with the patient today. Questions were answered. Prep is prescribed per orders. The patient was counseled at length about the risks of katia Covid-19 during their perioperative period and any recovery window from their procedure. The patient was made aware that katia Covid-19  may worsen their prognosis for recovering from their procedure and lend to a higher morbidity and/or mortality risk. All material risks, benefits, and reasonable alternatives including postponing the procedure were discussed. The patient does wish to proceed with the procedure at this time.

## 2021-07-01 NOTE — PROGRESS NOTES
1. Have you been to the ER, urgent care clinic since your last visit? Hospitalized since your last visit? new pt    2. Have you seen or consulted any other health care providers outside of the [address] [place] [address] since your last visit? Include any pap smears or colon screening.  new pt

## 2021-07-01 NOTE — PROGRESS NOTES
1. Have you been to the ER, urgent care clinic since your last visit? Hospitalized since your last visit?no    2. Have you seen or consulted any other health care providers outside of the [address] [place] [address] since your last visit? Include any pap smears or colon screening.  no

## 2021-08-12 ENCOUNTER — ANESTHESIA EVENT (OUTPATIENT)
Dept: SURGERY | Age: 56
End: 2021-08-12
Payer: MEDICAID

## 2021-08-12 NOTE — ANESTHESIA PREPROCEDURE EVALUATION
Relevant Problems   NEUROLOGY   (+) KEY (generalized anxiety disorder)      GASTROINTESTINAL   (+) Gastroesophageal reflux disease without esophagitis      ENDOCRINE   (+) Class 1 obesity due to excess calories with body mass index (BMI) of 31.0 to 31.9 in adult       Anesthetic History        Pertinent negatives: No PONV  Comments: No prior anesthetics. No family history. Review of Systems / Medical History  Patient summary reviewed, nursing notes reviewed and pertinent labs reviewed    Pulmonary              Pertinent negatives: No asthma and smoker     Neuro/Psych         Psychiatric history (generalized anxiety disorder)  Pertinent negatives: No seizures and CVA   Cardiovascular                Pertinent negatives: No hypertension, past MI and angina  Exercise tolerance: >4 METS     GI/Hepatic/Renal     GERD (part of her IBS, no meds )        Pertinent negatives: No liver disease and renal disease  Comments: IBS Endo/Other        Obesity  Pertinent negatives: No diabetes and morbid obesity   Other Findings            Physical Exam    Airway  Mallampati: II  TM Distance: 4 - 6 cm  Neck ROM: normal range of motion   Mouth opening: Normal     Cardiovascular    Rhythm: regular  Rate: normal         Dental  No notable dental hx       Pulmonary  Breath sounds clear to auscultation               Abdominal  GI exam deferred       Other Findings            Anesthetic Plan    ASA: 2  Anesthesia type: MAC            Anesthetic plan and risks discussed with: Patient      Plan monitored anesthetic care. Patient understands risks including risk of awareness and agrees with plan. All questions answered. Consent signed.

## 2021-08-13 ENCOUNTER — HOSPITAL ENCOUNTER (OUTPATIENT)
Dept: PREADMISSION TESTING | Age: 56
Discharge: HOME | End: 2021-08-13

## 2021-08-13 RX ORDER — MECLIZINE HYDROCHLORIDE 25 MG/1
TABLET ORAL
COMMUNITY
End: 2021-12-13

## 2021-08-13 NOTE — PERIOP NOTES
[address] [place] [address]  SURGICAL PRE-ADMISSION INSTRUCTIONS    ARRIVAL  · You will be called the day before your surgery with your expected arrival time. · Sign in at the  of the hospital.  You will be directed to the Surgical Waiting Room. · Please arrive at your scheduled appointment time. You have been scheduled to arrive for your procedure one or two hours prior to the expected start time of your procedure. · Every effort will be made to minimize your wait but please be aware that unforeseen circumstances may affect our schedule. EATING  · DO NOT EAT OR DRINK ANYTHING AFTER MIDNIGHT ON THE EVENING BEFORE YOUR SURGERY OR ON THE DAY OF YOUR SURGERY except for your medications (as instructed) with a sip of water. · Do not use gum, mints or lozenges on the morning of your surgery. · Please do not smoke or chew tobacco before your surgery. MEDICATIONS   · none    STOP THESE MEDICATIONS AT THE TIMES LISTED BELOW  none     DRIVING/TRANSPORATION  · Have a responsible adult to drive you home from the hospital and to stay with you over night. Please have them plan to remain in the hospital during your surgery. Your surgery will not be done if you do not have a responsible adult to take you home and to stay with you. · If you have arranged for public transport, you must have a responsible adult to ride with you (who is not the ). · You may not drive for 24 hours after anesthesia. PREPARATION  · If you have a Living WiIl/Advance Directive, please bring a copy with you to scan into your chart. · Please DO NOT wear makeup or nail polish  · Please leave valuables at home,  DO NOT wear jewelry. · Wear loose, comfortable clothing that is large enough to cover a bulky dressing. SPECIAL INSTRUCTIONS:  · Follow your surgeon's instructions for preoperative bowel prep.     Reviewed above preoperative instructions and answered questions by phone interview    Patient:  Alline Matters Julio Gordon   Date:     August 13, 2021  Time:   12:21 PM    RN:  Lexi Swift RN    Date:     August 13, 2021  Time:   12:21 PM

## 2021-08-17 RX ORDER — DIPHENHYDRAMINE HYDROCHLORIDE 50 MG/ML
12.5 INJECTION, SOLUTION INTRAMUSCULAR; INTRAVENOUS
Status: CANCELLED | OUTPATIENT
Start: 2021-08-17

## 2021-08-17 RX ORDER — SODIUM CHLORIDE 0.9 % (FLUSH) 0.9 %
5-40 SYRINGE (ML) INJECTION EVERY 8 HOURS
Status: CANCELLED | OUTPATIENT
Start: 2021-08-17

## 2021-08-17 RX ORDER — SODIUM CHLORIDE, SODIUM LACTATE, POTASSIUM CHLORIDE, CALCIUM CHLORIDE 600; 310; 30; 20 MG/100ML; MG/100ML; MG/100ML; MG/100ML
100 INJECTION, SOLUTION INTRAVENOUS CONTINUOUS
Status: CANCELLED | OUTPATIENT
Start: 2021-08-17

## 2021-08-17 RX ORDER — SODIUM CHLORIDE 0.9 % (FLUSH) 0.9 %
5-40 SYRINGE (ML) INJECTION AS NEEDED
Status: CANCELLED | OUTPATIENT
Start: 2021-08-17

## 2021-08-19 NOTE — H&P
History and Physical    Richar Nazario is an 64 y.o. female who is referred by:  Jass Tyler MD for evaluation for colonoscopy. Indication: screening exam.  Patient has complaint of diarrhea for many years. She believes it started 10-20 years ago. She has between 3-4 non-formed bowel movements per day. It is accompanied at times with non-specific abdominal discomfort. She denies issues with constipation, blood per rectum, pain with bowel movements or weight loss. In-addition to the diarrhea she has been treated on and off for reflux without esophagitis. She denies any prior abdominal surgery, family history of colon ca or IBD.     HPI           Patient Active Problem List   Diagnosis Code    Irritable bowel syndrome without diarrhea K58.9    Gastroesophageal reflux disease without esophagitis K21.9    Generalized abdominal pain R10.84    Left carpal tunnel syndrome G56.02    KEY (generalized anxiety disorder) F41.1    Class 1 obesity due to excess calories with body mass index (BMI) of 31.0 to 31.9 in adult E66.09, Z68.31           Patient Active Problem List     Diagnosis Date Noted    Irritable bowel syndrome without diarrhea 04/09/2021    Gastroesophageal reflux disease without esophagitis 04/09/2021    Generalized abdominal pain 04/09/2021    Left carpal tunnel syndrome 04/09/2021    KEY (generalized anxiety disorder) 04/09/2021    Class 1 obesity due to excess calories with body mass index (BMI) of 31.0 to 31.9 in adult 04/09/2021             Current Outpatient Medications   Medication Sig Dispense Refill    meclizine (ANTIVERT) 25 mg tablet Take 1 Tablet by mouth three (3) times daily as needed for Dizziness for up to 10 days. Indications: sensation of spinning or whirling 90 Tablet 5    fluticasone propionate (FLONASE) 50 mcg/actuation nasal spray 1 puff twice daily each nostril 1 Bottle 5    naproxen (NAPROSYN) 500 mg tablet Take 1 Tab by mouth two (2) times daily (with meals).  [facility] Tab 1    chlorzoxazone (PARAFON FORTE) 500 mg tablet Take 1 Tab by mouth four (4) times daily as needed for Muscle Spasm(s). 60 Tab 2    omeprazole (PRILOSEC) 40 mg capsule Take 1 Cap by mouth daily. 30 Cap 1    dicyclomine (BENTYL) 10 mg capsule Take 1 Cap by mouth three (3) times daily. Indications: irritable colon 90 Cap 5    busPIRone (BUSPAR) 10 mg tablet Take 1 Tab by mouth three (3) times daily. 90 Tab 2            Allergies   Allergen Reactions    Other Medication Unknown (comments)       Horse serum    Sulfa (Sulfonamide Antibiotics) Other (comments)       Patient states felt \"very sick and had high fever\"      History reviewed. No pertinent past medical history. History reviewed. No pertinent surgical history. Family History   Problem Relation Age of Onset   [facility] Cancer Mother      Diabetes Mother      Cancer Father      Diabetes Father      Breast Cancer Sister        Social History           Tobacco Use    Smoking status: Never Smoker    Smokeless tobacco: Never Used   Substance Use Topics    Alcohol use: Not on file         Review of Systems  Review of Systems   Constitutional: Negative for chills, fever, malaise/fatigue and weight loss. HENT: Positive for congestion. Negative for sore throat. Respiratory: Negative for cough and shortness of breath. Cardiovascular: Negative for chest pain, palpitations, orthopnea, claudication and leg swelling. Gastrointestinal: Positive for diarrhea and heartburn. Negative for abdominal pain, blood in stool, constipation, nausea and vomiting. Musculoskeletal: Negative for back pain, joint pain and neck pain. Skin: Negative for itching and rash. Neurological: Negative for focal weakness and headaches.    Psychiatric/Behavioral: The patient is nervous/anxious.             Objective:      Visit Vitals  BP (!) 109/55 (BP 1 Location: Left upper arm, BP Patient Position: Sitting)   Pulse 60   Resp 18   Ht 5' 5\" (1.651 m)   Wt 193 lb 6.4 oz (87.7 kg)   SpO2 98%   BMI 32.18 kg/m²      Physical Exam  Constitutional:       General: She is not in acute distress. Appearance: Normal appearance. She is obese. She is not ill-appearing or toxic-appearing. HENT:      Head: Normocephalic and atraumatic. Nose: Nose normal. No congestion. Mouth/Throat:      Mouth: Mucous membranes are moist.      Pharynx: Oropharynx is clear. Eyes:      General: No scleral icterus. Cardiovascular:      Rate and Rhythm: Normal rate. Heart sounds: No murmur heard. Pulmonary:      Effort: Pulmonary effort is normal. No respiratory distress. Breath sounds: No wheezing. Abdominal:      General: There is no distension. Palpations: Abdomen is soft. Tenderness: There is no abdominal tenderness. There is no guarding. Musculoskeletal:         General: No swelling. Normal range of motion. Cervical back: Normal range of motion and neck supple. Lymphadenopathy:      Cervical: No cervical adenopathy. Skin:     General: Skin is warm. Coloration: Skin is not jaundiced. Neurological:      General: No focal deficit present. Mental Status: She is alert and oriented to person, place, and time.          Physical Exam  Constitutional:       General: She is not in acute distress. Appearance: Normal appearance. She is obese. She is not ill-appearing or toxic-appearing. HENT:      Head: Normocephalic and atraumatic. Nose: Nose normal. No congestion. Mouth/Throat:      Mouth: Mucous membranes are moist.      Pharynx: Oropharynx is clear. Eyes:      General: No scleral icterus. Cardiovascular:      Rate and Rhythm: Normal rate. Heart sounds: No murmur heard. Pulmonary:      Effort: Pulmonary effort is normal. No respiratory distress. Breath sounds: No wheezing. Abdominal:      General: There is no distension. Palpations: Abdomen is soft. Tenderness: There is no abdominal tenderness.  There is no guarding. Musculoskeletal:         General: No swelling. Normal range of motion. Cervical back: Normal range of motion and neck supple. Lymphadenopathy:      Cervical: No cervical adenopathy. Skin:     General: Skin is warm. Coloration: Skin is not jaundiced. Neurological:      General: No focal deficit present. Mental Status: She is alert and oriented to person, place, and time.             Assessment/Plan:      Colonoscopy is indicated as noted above and we will proceed to colonoscopy.     The risks (bleeding, perforation, spleen injury, etc.) and benefits of my recommendations, as well as other treatment options were discussed with the patient today. Questions were answered. Prep is prescribed per orders.     The patient was counseled at length about the risks of katia Covid-19 during their perioperative period and any recovery window from their procedure.  The patient was made aware that katia Covid-19  may worsen their prognosis for recovering from their procedure and lend to a higher morbidity and/or mortality risk.  All material risks, benefits, and reasonable alternatives including postponing the procedure were discussed. The patient does wish to proceed with the procedure at this time.

## 2021-08-20 ENCOUNTER — ANESTHESIA (OUTPATIENT)
Dept: SURGERY | Age: 56
End: 2021-08-20
Payer: MEDICAID

## 2021-08-20 ENCOUNTER — HOSPITAL ENCOUNTER (OUTPATIENT)
Age: 56
Setting detail: OUTPATIENT SURGERY
Discharge: HOME | End: 2021-08-20
Attending: SURGERY | Admitting: SURGERY
Payer: MEDICAID

## 2021-08-20 VITALS
HEART RATE: 56 BPM | DIASTOLIC BLOOD PRESSURE: 47 MMHG | RESPIRATION RATE: 12 BRPM | TEMPERATURE: 97.2 F | SYSTOLIC BLOOD PRESSURE: 112 MMHG | OXYGEN SATURATION: 97 %

## 2021-08-20 DIAGNOSIS — Z12.11 COLON CANCER SCREENING: ICD-10-CM

## 2021-08-20 LAB — HCG UR QL: NEGATIVE

## 2021-08-20 PROCEDURE — 77030027957 HC TBNG IRR ENDOGTR BUSS -B: Performed by: SURGERY

## 2021-08-20 PROCEDURE — 76010000154 HC OR TIME FIRST 0.5 HR: Performed by: SURGERY

## 2021-08-20 PROCEDURE — 74011000250 HC RX REV CODE- 250: Performed by: ANESTHESIOLOGY

## 2021-08-20 PROCEDURE — 81025 URINE PREGNANCY TEST: CPT

## 2021-08-20 PROCEDURE — 76210000006 HC OR PH I REC 0.5 TO 1 HR: Performed by: SURGERY

## 2021-08-20 PROCEDURE — 74011250636 HC RX REV CODE- 250/636: Performed by: ANESTHESIOLOGY

## 2021-08-20 PROCEDURE — 45380 COLONOSCOPY AND BIOPSY: CPT | Performed by: SURGERY

## 2021-08-20 PROCEDURE — 88305 TISSUE EXAM BY PATHOLOGIST: CPT

## 2021-08-20 PROCEDURE — 76060000031 HC ANESTHESIA FIRST 0.5 HR: Performed by: SURGERY

## 2021-08-20 PROCEDURE — 77030037006 HC FCPS BIOP ENDOSC DISP OCOA -A: Performed by: SURGERY

## 2021-08-20 PROCEDURE — 74011250636 HC RX REV CODE- 250/636: Performed by: SURGERY

## 2021-08-20 PROCEDURE — 2709999900 HC NON-CHARGEABLE SUPPLY: Performed by: SURGERY

## 2021-08-20 RX ORDER — SODIUM CHLORIDE 0.9 % (FLUSH) 0.9 %
5-40 SYRINGE (ML) INJECTION AS NEEDED
Status: DISCONTINUED | OUTPATIENT
Start: 2021-08-20 | End: 2021-08-20 | Stop reason: HOSPADM

## 2021-08-20 RX ORDER — PROPOFOL 10 MG/ML
INJECTION, EMULSION INTRAVENOUS AS NEEDED
Status: DISCONTINUED | OUTPATIENT
Start: 2021-08-20 | End: 2021-08-20 | Stop reason: HOSPADM

## 2021-08-20 RX ORDER — LIDOCAINE HYDROCHLORIDE 20 MG/ML
INJECTION, SOLUTION EPIDURAL; INFILTRATION; INTRACAUDAL; PERINEURAL AS NEEDED
Status: DISCONTINUED | OUTPATIENT
Start: 2021-08-20 | End: 2021-08-20 | Stop reason: HOSPADM

## 2021-08-20 RX ORDER — MIDAZOLAM HYDROCHLORIDE 1 MG/ML
INJECTION, SOLUTION INTRAMUSCULAR; INTRAVENOUS AS NEEDED
Status: DISCONTINUED | OUTPATIENT
Start: 2021-08-20 | End: 2021-08-20 | Stop reason: HOSPADM

## 2021-08-20 RX ORDER — SODIUM CHLORIDE 0.9 % (FLUSH) 0.9 %
5-40 SYRINGE (ML) INJECTION EVERY 8 HOURS
Status: DISCONTINUED | OUTPATIENT
Start: 2021-08-20 | End: 2021-08-20 | Stop reason: HOSPADM

## 2021-08-20 RX ORDER — SODIUM CHLORIDE, SODIUM LACTATE, POTASSIUM CHLORIDE, CALCIUM CHLORIDE 600; 310; 30; 20 MG/100ML; MG/100ML; MG/100ML; MG/100ML
125 INJECTION, SOLUTION INTRAVENOUS CONTINUOUS
Status: DISCONTINUED | OUTPATIENT
Start: 2021-08-20 | End: 2021-08-20 | Stop reason: HOSPADM

## 2021-08-20 RX ORDER — GLYCOPYRROLATE 0.2 MG/ML
INJECTION INTRAMUSCULAR; INTRAVENOUS AS NEEDED
Status: DISCONTINUED | OUTPATIENT
Start: 2021-08-20 | End: 2021-08-20 | Stop reason: HOSPADM

## 2021-08-20 RX ORDER — ONDANSETRON 2 MG/ML
INJECTION INTRAMUSCULAR; INTRAVENOUS AS NEEDED
Status: DISCONTINUED | OUTPATIENT
Start: 2021-08-20 | End: 2021-08-20 | Stop reason: HOSPADM

## 2021-08-20 RX ADMIN — GLYCOPYRROLATE 0.2 MG: 0.2 INJECTION, SOLUTION INTRAMUSCULAR; INTRAVENOUS at 08:09

## 2021-08-20 RX ADMIN — LIDOCAINE HYDROCHLORIDE 10 MG: 20 INJECTION, SOLUTION EPIDURAL; INFILTRATION; INTRACAUDAL; PERINEURAL at 08:17

## 2021-08-20 RX ADMIN — PROPOFOL 10 MG: 10 INJECTION, EMULSION INTRAVENOUS at 08:25

## 2021-08-20 RX ADMIN — SODIUM CHLORIDE, POTASSIUM CHLORIDE, SODIUM LACTATE AND CALCIUM CHLORIDE 125 ML/HR: 600; 310; 30; 20 INJECTION, SOLUTION INTRAVENOUS at 07:57

## 2021-08-20 RX ADMIN — PROPOFOL 40 MG: 10 INJECTION, EMULSION INTRAVENOUS at 08:11

## 2021-08-20 RX ADMIN — PROPOFOL 20 MG: 10 INJECTION, EMULSION INTRAVENOUS at 08:14

## 2021-08-20 RX ADMIN — PROPOFOL 20 MG: 10 INJECTION, EMULSION INTRAVENOUS at 08:21

## 2021-08-20 RX ADMIN — PROPOFOL 10 MG: 10 INJECTION, EMULSION INTRAVENOUS at 08:31

## 2021-08-20 RX ADMIN — LIDOCAINE HYDROCHLORIDE 10 MG: 20 INJECTION, SOLUTION EPIDURAL; INFILTRATION; INTRACAUDAL; PERINEURAL at 08:14

## 2021-08-20 RX ADMIN — LIDOCAINE HYDROCHLORIDE 5 MG: 20 INJECTION, SOLUTION EPIDURAL; INFILTRATION; INTRACAUDAL; PERINEURAL at 08:25

## 2021-08-20 RX ADMIN — ONDANSETRON 4 MG: 2 INJECTION INTRAMUSCULAR; INTRAVENOUS at 08:17

## 2021-08-20 RX ADMIN — LIDOCAINE HYDROCHLORIDE 10 MG: 20 INJECTION, SOLUTION EPIDURAL; INFILTRATION; INTRACAUDAL; PERINEURAL at 08:23

## 2021-08-20 RX ADMIN — LIDOCAINE HYDROCHLORIDE 10 MG: 20 INJECTION, SOLUTION EPIDURAL; INFILTRATION; INTRACAUDAL; PERINEURAL at 08:19

## 2021-08-20 RX ADMIN — PROPOFOL 20 MG: 10 INJECTION, EMULSION INTRAVENOUS at 08:23

## 2021-08-20 RX ADMIN — PROPOFOL 10 MG: 10 INJECTION, EMULSION INTRAVENOUS at 08:32

## 2021-08-20 RX ADMIN — LIDOCAINE HYDROCHLORIDE 20 MG: 20 INJECTION, SOLUTION EPIDURAL; INFILTRATION; INTRACAUDAL; PERINEURAL at 08:11

## 2021-08-20 RX ADMIN — MIDAZOLAM 2 MG: 1 INJECTION INTRAMUSCULAR; INTRAVENOUS at 08:09

## 2021-08-20 RX ADMIN — PROPOFOL 20 MG: 10 INJECTION, EMULSION INTRAVENOUS at 08:19

## 2021-08-20 RX ADMIN — LIDOCAINE HYDROCHLORIDE 5 MG: 20 INJECTION, SOLUTION EPIDURAL; INFILTRATION; INTRACAUDAL; PERINEURAL at 08:31

## 2021-08-20 RX ADMIN — LIDOCAINE HYDROCHLORIDE 10 MG: 20 INJECTION, SOLUTION EPIDURAL; INFILTRATION; INTRACAUDAL; PERINEURAL at 08:21

## 2021-08-20 RX ADMIN — PROPOFOL 20 MG: 10 INJECTION, EMULSION INTRAVENOUS at 08:17

## 2021-08-20 RX ADMIN — LIDOCAINE HYDROCHLORIDE 5 MG: 20 INJECTION, SOLUTION EPIDURAL; INFILTRATION; INTRACAUDAL; PERINEURAL at 08:32

## 2021-08-20 NOTE — BRIEF OP NOTE
Brief Postoperative Note    Patient: Karin Emmanuel  YOB: 1965  MRN: [id number]    Date of Procedure: 8/20/2021     Pre-Op Diagnosis: Screening Colonoscopy    Post-Op Diagnosis: Same as preoperative diagnosis.       Procedure(s):  COLONOSCOPY    Surgeon(s):  Joan Grover MD    Surgical Assistant: None    Anesthesia: MAC     Estimated Blood Loss (mL): Minimal    Complications: None    Specimens:   ID Type Source Tests Collected by Time Destination   1 : Random Colon Biopsies Preservative Colon  Joan Grover MD 8/20/2021 [facility]        Implants: * No implants in log *    Drains: * No LDAs found *    Findings: Normal colon    Electronically Signed by Tonya Marshall MD on 8/20/2021 at 8:37 AM

## 2021-08-20 NOTE — INTERVAL H&P NOTE
Update History & Physical    The Patient's History and Physical of August 19, 2021 was reviewed with the patient and I examined the patient. There was no change. The surgical site was confirmed by the patient and me. Plan:  The risk, benefits, expected outcome, and alternative to the recommended procedure have been discussed with the patient. Patient understands and wants to proceed with the procedure.     Electronically signed by Zonia Fu MD on 8/20/2021 at 8:02 AM

## 2021-08-20 NOTE — OP NOTES
[facility]  OPERATIVE REPORT    Name:  Bhavya Oneil  MR#:  [id number]  :  1965  ACCOUNT #:  [de-identified]  DATE OF SERVICE:  2021    PREOPERATIVE DIAGNOSES:  Screening colonoscopy and diarrhea. POSTOPERATIVE DIAGNOSES:  Screening colonoscopy and diarrhea. PROCEDURE PERFORMED:  Colonoscopy with random colon biopsies. SURGEON:  Jim Reese MD    ASSISTANT:  None    ANESTHESIA:  MAC.    COMPLICATIONS:  None. SPECIMENS REMOVED:  Random colon biopsies. IMPLANTS:  None    ESTIMATED BLOOD LOSS:  Minimal.    FINDINGS:  Normal colon    DISPOSITION:  Stable. PROCEDURE:  The patient was brought to the operative theater. Monitoring devices and nasal cannula O2 were placed per Anesthesia, and the patient was placed in the left side down decubitus position. IV sedation was administered and a time-out was performed. Digital rectal exam was performed which was essentially normal.  A well-lubricated Olympus colonoscope was introduced in the patient's rectum and advanced to the cecum without any difficulty. The cecum was identified by identification of ileocecal valve and the appendiceal orifice. The prep was excellent. The scope was carefully withdrawn circumferentially evaluating the mucosa. We elected to take random colonic biopsies because of the patient's history of diarrhea for a number of years. Over 10 random biopsies were obtained. The scope was carefully withdrawn with the mucosa circumferentially evaluated from the cecum to the rectum with no abnormalities identified. In particular, we noticed no masses, no lesions, and no diverticulosis. The scope was retroflexed in the rectum without any additional abnormalities seen and then straightened out and carefully withdrawn. The patient tolerated the procedure well and was transferred to PACU in stable condition.     Depending on what the biopsies show, she may not require colonoscopy for up to 10 years.      Bob Boone MD      [id number]  D:  08/20/2021 8:40  T:  08/20/2021 9:53  JOB #:  [id number]  CC:   Dr. Andres Bazzi

## 2021-08-20 NOTE — DISCHARGE INSTRUCTIONS
Patient Education        Colonoscopy: What to Expect at [address] [place] [address]  After a colonoscopy, you'll stay at the clinic for 1 to 2 hours until the medicines wear off. Then you can go home. But you'll need to arrange for a ride. Your doctor will tell you when you can eat and do your other usual activities. Your doctor will talk to you about when you'll need your next colonoscopy. Your doctor can help you decide how often you need to be checked. This will depend on the results of your test and your risk for colorectal cancer. After the test, you may be bloated or have gas pains. You may need to pass gas. If a biopsy was done or a polyp was removed, you may have streaks of blood in your stool (feces) for a few days. Problems such as heavy rectal bleeding may not occur until several weeks after the test. This isn't common. But it can happen after polyps are removed. This care sheet gives you a general idea about how long it will take for you to recover. But each person recovers at a different pace. Follow the steps below to get better as quickly as possible. How can you care for yourself at home? Activity    · Rest when you feel tired.     · You can do your normal activities when it feels okay to do so. Diet    · Follow your doctor's directions for eating.     · Unless your doctor has told you not to, drink plenty of fluids. This helps to replace the fluids that were lost during the colon prep.     · Do not drink alcohol. · Avoid fried, greasy, spicy foods day of procedure. Medicines    · Your doctor will tell you if and when you can restart your medicines. He or she will also give you instructions about taking any new medicines.     · If you take aspirin or some other blood thinner, ask your doctor if and when to start taking it again.  Make sure that you understand exactly what your doctor wants you to do.     · If polyps were removed or a biopsy was done during the test, your doctor may tell you not to take aspirin or other anti-inflammatory medicines for a few days. These include ibuprofen (Advil, Motrin) and naproxen (Aleve). Other instructions    · For your safety, do not drive or operate machinery until the medicine wears off and you can think clearly. Your doctor may tell you not to drive or operate machinery until the day after your test.     · Do not sign legal documents or make major decisions until the medicine wears off and you can think clearly. The anesthesia can make it hard for you to fully understand what you are agreeing to. Follow-up care is a key part of your treatment and safety. Be sure to make and go to all appointments, and call your doctor if you are having problems. It's also a good idea to know your test results and keep a list of the medicines you take. When should you call for help? Call 911 anytime you think you may need emergency care. For example, call if:    · You passed out (lost consciousness).     · You pass maroon or bloody stools.     · You have trouble breathing. Call your doctor now or seek immediate medical care if:    · You have pain that does not get better after you take pain medicine.     · You are sick to your stomach or cannot drink fluids.     · You have new or worse belly pain.     · You have blood in your stools.     · You have a fever.     · You cannot pass stools or gas. Watch closely for changes in your health, and be sure to contact your doctor if you have any problems. Where can you learn more? Go to http://www.gray.com/  Enter E264 in the search box to learn more about \"Colonoscopy: What to Expect at Home. \"  Current as of: December 17, 2020               Content Version: 12.8  © 8483-4170 [employer]. Care instructions adapted under license by [employer] (which disclaims liability or warranty for this information).  If you have questions about a medical condition or this instruction, always ask your healthcare professional. [place] [address] any warranty or liability for your use of this information.

## 2021-08-23 DIAGNOSIS — S46.811A INFRASPINATUS STRAIN, RIGHT, INITIAL ENCOUNTER: ICD-10-CM

## 2021-08-23 RX ORDER — NAPROXEN 500 MG/1
500 TABLET ORAL 2 TIMES DAILY WITH MEALS
Qty: 60 TABLET | Refills: 1 | OUTPATIENT
Start: 2021-08-23 | End: 2021-12-13

## 2021-08-30 ENCOUNTER — OFFICE VISIT (OUTPATIENT)
Dept: SURGERY | Age: 56
End: 2021-08-30
Payer: MEDICAID

## 2021-08-30 VITALS
DIASTOLIC BLOOD PRESSURE: 75 MMHG | HEART RATE: 55 BPM | RESPIRATION RATE: 18 BRPM | HEIGHT: 65 IN | SYSTOLIC BLOOD PRESSURE: 149 MMHG | WEIGHT: 199 LBS | BODY MASS INDEX: 33.15 KG/M2

## 2021-08-30 DIAGNOSIS — K58.9 IRRITABLE BOWEL SYNDROME WITHOUT DIARRHEA: Primary | ICD-10-CM

## 2021-08-30 PROCEDURE — 99213 OFFICE O/P EST LOW 20 MIN: CPT | Performed by: SURGERY

## 2021-08-30 NOTE — LETTER
8/30/2021    Patient: Maria Wahl   YOB: 1965   Date of Visit: 8/30/2021     Errol Murray MD  [address] [place] [address]  [place] [place]    Dear Errol Murray MD,      Thank you for referring Ms. Osito Faust to [address] [place] for evaluation. My notes for this consultation are attached. If you have questions, please do not hesitate to call me. I look forward to following your patient along with you.       Sincerely,    Haydee Magdaleno MD

## 2021-08-30 NOTE — PATIENT INSTRUCTIONS
High-Fiber Diet: Care Instructions  Your Care Instructions     A high-fiber diet may help you relieve constipation and feel less bloated. Your doctor and dietitian will help you make a high-fiber eating plan based on your personal needs. The plan will include the things you like to eat. It will also make sure that you get 30 grams of fiber a day. Before you make changes to the way you eat, be sure to talk with your doctor or dietitian. Follow-up care is a key part of your treatment and safety. Be sure to make and go to all appointments, and call your doctor if you are having problems. It's also a good idea to know your test results and keep a list of the medicines you take. How can you care for yourself at home? · You can increase how much fiber you get if you eat more of certain foods. These foods include:  ? Whole-grain breads and cereals. ? Fruits, such as pears, apples, and peaches. Eat the skins, peels, and seeds, if you can.  ? Vegetables, such as broccoli, cabbage, spinach, carrots, asparagus, and squash. ? Starchy vegetables. These include potatoes with skins, kidney beans, and lima beans. · Take a fiber supplement every day if your doctor recommends it. Examples are Benefiber, Citrucel, FiberCon, and Metamucil. Ask your doctor how much to take. · Drink plenty of fluids. If you have kidney, heart, or liver disease and have to limit fluids, talk with your doctor before you increase the amount of fluids you drink. · Get some exercise every day. Exercise helps stool move through the colon. It also helps prevent constipation. · Keep a food diary. Try to notice and write down what foods cause gas, pain, or other symptoms. Then you can avoid these foods. Where can you learn more? Go to http://www.gray.com/  Enter M050 in the search box to learn more about \"High-Fiber Diet: Care Instructions. \"  Current as of: December 17, 2020               Content Version: 12.8  © 7706-4745 [employer]. Care instructions adapted under license by [employer] (which disclaims liability or warranty for this information). If you have questions about a medical condition or this instruction, always ask your healthcare professional. [place] [address] any warranty or liability for your use of this information.

## 2021-08-30 NOTE — PROGRESS NOTES
[address] [facility] Surgery      Clinic Note - Follow up    Zana Otoole returns for scheduled follow up today. She is status post colonoscopy performed back on August 20. No significant abnormalities were noted in the colon. Random biopsies were obtained which did not show any specific etiology of the patient's symptoms. She states she continues to have some bloating and loose stools. She knows she has lactose intolerance and avoids these products. These ongoing symptoms of been going on for at least 20 to 30 years. She believes last time she had any food allergy testing was over 20 years ago. She also believes she has had about a 15 pound weight gain over the last 3 to 4 weeks. 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 Objective     Visit Vitals  BP (!) 149/75 (BP 1 Location: Left upper arm, BP Patient Position: At rest, BP Cuff Size: Adult)   Pulse (!) 55   Resp 18   Ht 5' 5\" (1.651 m)   Wt 199 lb (90.3 kg)   BMI 33.12 kg/m²         PE  GEN - Awake, alert, communicating appropriately. NAD      Assessment     Anastacia Marrero is a 64 y. o.yr old female status post colonoscopy with no clear etiology of her symptoms determined. This certainly may be irritable bowel. She has had some success in the past with fiber supplementation and probiotics. I have encouraged her to continue this. Plan     I have encouraged her to continue fiber supplementation with probiotics. I had like to see her back in 2 to 3 months. If she is continue to have ongoing symptoms she may benefit from food allergy testing. In terms of her weight gain on further questioning she states she has been told before that she has some \"fluid around her heart\" in the past.  I encouraged her to reach out to her primary care physician.     Jaylin Petersen MD    CC: Dr. Corky Fu

## 2021-12-06 ENCOUNTER — OFFICE VISIT (OUTPATIENT)
Dept: SURGERY | Age: 56
End: 2021-12-06
Payer: MEDICAID

## 2021-12-06 VITALS
DIASTOLIC BLOOD PRESSURE: 67 MMHG | HEART RATE: 76 BPM | HEIGHT: 65 IN | WEIGHT: 203 LBS | SYSTOLIC BLOOD PRESSURE: 120 MMHG | BODY MASS INDEX: 33.82 KG/M2

## 2021-12-06 DIAGNOSIS — K58.9 IRRITABLE BOWEL SYNDROME WITHOUT DIARRHEA: Primary | ICD-10-CM

## 2021-12-06 PROCEDURE — 99213 OFFICE O/P EST LOW 20 MIN: CPT | Performed by: SURGERY

## 2021-12-06 NOTE — LETTER
12/6/2021    Patient: Brigida Pedro   YOB: 1965   Date of Visit: 12/6/2021     Frankie Whitney MD  [address] [place] [address] Av  [address] [facility] [address]    Dear Frankie Whitney MD,      Thank you for referring Ms. Miracle Veras to [address] [facility] for evaluation. My notes for this consultation are attached. If you have questions, please do not hesitate to call me. I look forward to following your patient along with you.       Sincerely,    India Monteiro MD

## 2021-12-06 NOTE — PATIENT INSTRUCTIONS
High-Fiber Diet: Care Instructions  Overview     A high-fiber diet may help you relieve constipation and feel less bloated. Your doctor and dietitian will help you make a high-fiber eating plan based on your personal needs. The plan will include the things you like to eat. It will also make sure that you get 25 to 35 grams of fiber a day. Before you make changes to the way you eat, be sure to talk with your doctor or dietitian. Follow-up care is a key part of your treatment and safety. Be sure to make and go to all appointments, and call your doctor if you are having problems. It's also a good idea to know your test results and keep a list of the medicines you take. How can you care for yourself at home? · You can increase how much fiber you get if you eat more of certain foods. These foods include:  ? Whole-grain breads and cereals. ? Fruits, such as pears, apples, and peaches. Eat the skins and peels if you can.  ? Vegetables, such as broccoli, cabbage, spinach, carrots, asparagus, and squash. ? Starchy vegetables. These include potatoes with skins, kidney beans, and lima beans. · Take a fiber supplement every day if your doctor recommends it. Examples are Benefiber, Citrucel, FiberCon, and Metamucil. Ask your doctor how much to take. · Drink plenty of fluids. If you have kidney, heart, or liver disease and have to limit fluids, talk with your doctor before you increase the amount of fluids you drink. Where can you learn more? Go to http://www.gray.com/  Enter K748 in the search box to learn more about \"High-Fiber Diet: Care Instructions. \"  Current as of: December 17, 2020               Content Version: 13.0  © 3484-5931 [employer]. Care instructions adapted under license by [employer] (which disclaims liability or warranty for this information).  If you have questions about a medical condition or this instruction, always ask your healthcare professional. [address] any warranty or liability for your use of this information.

## 2021-12-06 NOTE — PROGRESS NOTES
Michela White is a 64 y.o. female who presents today with the following:  Chief Complaint   Patient presents with   [facility] OP Follow Up       HPI    Ms. Maria Del Rosario Rayo presents for 3-month follow-up. Her diarrhea has changed after she made some dietary changes. Particular she stopped soda and ice tea and the diarrhea has stopped. She was on a probiotic and a very low dose of fiber and then she developed constipation so she discontinued these. She tried a Dulcolax tablet and then developed diarrhea with what sounds like at least one episode of fecal incontinence and so she discontinued that. Currently she states that she is taking no medications for her GI tract. She has started to eat healthier in terms of unprocessed foods. She has had some weight gain. She reminds me of the 3 episodes of inflammatory disease she had in the remote past.  She states her father was recently diagnosed with an egg white and possible gluten allergy.       Past Medical History:   Diagnosis Date    IBS (irritable bowel syndrome)        Past Surgical History:   Procedure Laterality Date    COLONOSCOPY N/A 8/20/2021    COLONOSCOPY performed by Todd Jo MD at [address] [place] [address] History     Socioeconomic History    Marital status:      Spouse name: Not on file    Number of children: Not on file    Years of education: Not on file    Highest education level: Not on file   Occupational History    Not on file   Tobacco Use    Smoking status: Never Smoker    Smokeless tobacco: Never Used   Substance and Sexual Activity    Alcohol use: Not on file    Drug use: Not on file    Sexual activity: Not on file   Other Topics Concern    Not on file   Social History Narrative    Not on file     Social Determinants of Health     Financial Resource Strain:     Difficulty of Paying Living Expenses: Not on file   Food Insecurity:     Worried About Running Out of Food in the Last Year: Not on file    [address] in the Last Year: Not on file   Transportation Needs:     Lack of Transportation (Medical): Not on file    Lack of Transportation (Non-Medical): Not on file   Physical Activity:     Days of Exercise per Week: Not on file    Minutes of Exercise per Session: Not on file   Stress:     Feeling of Stress : Not on file   Social Connections:     Frequency of Communication with Friends and Family: Not on file    Frequency of Social Gatherings with Friends and Family: Not on file    Attends Christianity Services: Not on file    Active Member of [address] [place] [address] [employer] or Organizations: Not on file    Attends Club or Organization Meetings: Not on file    Marital Status: Not on file   Intimate Partner Violence:     Fear of Current or Ex-Partner: Not on file    Emotionally Abused: Not on file    Physically Abused: Not on file    Sexually Abused: Not on file   Housing Stability:     Unable to Pay for Housing in the Last Year: Not on file    Number of [place] in the Last Year: Not on file    Unstable Housing in the Last Year: Not on file       Family History   Problem Relation Age of Onset    Cancer Mother     Diabetes Mother     Cancer Father     Diabetes Father     Breast Cancer Sister        Allergies   Allergen Reactions    Milk Diarrhea    Other Medication Unknown (comments)     Horse serum    Sulfa (Sulfonamide Antibiotics) Other (comments)     Patient states felt \"very sick and had high fever\"       Current Outpatient Medications   Medication Sig    fluticasone propionate (FLONASE) 50 mcg/actuation nasal spray 1 puff twice daily each nostril    naproxen (NAPROSYN) 500 mg tablet Take 1 Tablet by mouth two (2) times daily (with meals). (Patient not taking: Reported on 12/6/2021)    meclizine (ANTIVERT) 25 mg tablet Take  by mouth three (3) times daily as needed for Dizziness. (Patient not taking: Reported on 12/6/2021)    dicyclomine (BENTYL) 10 mg capsule Take 1 Cap by mouth three (3) times daily.  Indications: irritable colon (Patient not taking: Reported on 12/6/2021)    busPIRone (BUSPAR) 10 mg tablet Take 1 Tab by mouth three (3) times daily. (Patient not taking: Reported on 12/6/2021)     No current facility-administered medications for this visit. The above histories, medications and allergies have been reviewed. ROS    Visit Vitals  /67   Pulse 76   Ht 5' 4.75\" (1.645 m)   Wt 203 lb (92.1 kg)   BMI 34.04 kg/m²     Physical Exam  Constitutional:       Appearance: Normal appearance. Neurological:      Mental Status: She is alert. 1. Irritable bowel syndrome without diarrhea  I have encouraged her to take a fiber supplement. We discussed the options. I have told her start on a lower dose. I have also encouraged her to use MiraLAX and titrate to effect. We have explained how that if she ends up developing diarrhea she should decrease the amount of MiraLAX that she takes and if she continues to have constipation she can increase the dose. If she has recurrence of her diarrhea began it may be a benefit to do food allergy testing. She will follow up as needed. Follow-up and Dispositions    · Return if symptoms worsen or fail to improve.          India Monteiro MD

## 2021-12-08 NOTE — ANESTHESIA POSTPROCEDURE EVALUATION
Post-Anesthesia Evaluation and Assessment    Cardiovascular Function/Vital Signs  Visit Vitals  BP (!) 112/47   Pulse (!) 55   Temp 36.2 °C (97.2 °F)   Resp 11   SpO2 96%       Patient is status post Procedure(s):  COLONOSCOPY. Nausea/Vomiting: Controlled. Postoperative hydration reviewed and adequate. Pain:  Pain Scale 1: Numeric (0 - 10) (08/20/21 0842)  Pain Intensity 1: 2 (08/20/21 0716)   Managed. Neurological Status:   Neuro (WDL): Within Defined Limits (08/20/21 0842)   At baseline. Mental Status and Level of Consciousness: Arousable. Pulmonary Status:   O2 Device: CO2 nasal cannula (08/20/21 0841)   Adequate oxygenation and airway patent. Complications related to anesthesia: None    Post-anesthesia assessment completed. No concerns. Patient has met all discharge requirements.     Signed By: Dede Piedra MD    August 20, 2021 MEDICATIONS  (STANDING):  ALPRAZolam 0.25 milliGRAM(s) Oral at bedtime  atorvastatin 10 milliGRAM(s) Oral at bedtime  busPIRone 15 milliGRAM(s) Oral three times a day  enalapril 5 milliGRAM(s) Oral daily  metFORMIN 1000 milliGRAM(s) Oral two times a day  mirtazapine 30 milliGRAM(s) Oral at bedtime  mometasone 220 MICROgram(s) Inhaler 1 Puff(s) Inhalation two times a day

## 2021-12-13 ENCOUNTER — HOSPITAL ENCOUNTER (EMERGENCY)
Age: 56
Discharge: HOME | End: 2021-12-13
Attending: EMERGENCY MEDICINE | Admitting: EMERGENCY MEDICINE
Payer: MEDICAID

## 2021-12-13 ENCOUNTER — APPOINTMENT (OUTPATIENT)
Dept: GENERAL RADIOLOGY | Age: 56
End: 2021-12-13
Attending: EMERGENCY MEDICINE
Payer: MEDICAID

## 2021-12-13 VITALS
TEMPERATURE: 98.3 F | OXYGEN SATURATION: 97 % | DIASTOLIC BLOOD PRESSURE: 61 MMHG | RESPIRATION RATE: 17 BRPM | SYSTOLIC BLOOD PRESSURE: 139 MMHG | HEART RATE: 61 BPM

## 2021-12-13 DIAGNOSIS — S82.61XA CLOSED AVULSION FRACTURE OF LATERAL MALLEOLUS OF RIGHT FIBULA, INITIAL ENCOUNTER: Primary | ICD-10-CM

## 2021-12-13 PROCEDURE — 99283 EMERGENCY DEPT VISIT LOW MDM: CPT

## 2021-12-13 PROCEDURE — 74011250637 HC RX REV CODE- 250/637: Performed by: EMERGENCY MEDICINE

## 2021-12-13 PROCEDURE — 73610 X-RAY EXAM OF ANKLE: CPT

## 2021-12-13 RX ORDER — HYDROCODONE BITARTRATE AND ACETAMINOPHEN 5; 325 MG/1; MG/1
1 TABLET ORAL
Qty: 10 TABLET | Refills: 0 | Status: SHIPPED | OUTPATIENT
Start: 2021-12-13 | End: 2021-12-15 | Stop reason: ALTCHOICE

## 2021-12-13 RX ORDER — HYDROCODONE BITARTRATE AND ACETAMINOPHEN 5; 325 MG/1; MG/1
1 TABLET ORAL
Status: COMPLETED | OUTPATIENT
Start: 2021-12-13 | End: 2021-12-13

## 2021-12-13 RX ORDER — IBUPROFEN 600 MG/1
600 TABLET ORAL
Qty: 20 TABLET | Refills: 0 | Status: SHIPPED | OUTPATIENT
Start: 2021-12-13 | End: 2021-12-15 | Stop reason: ALTCHOICE

## 2021-12-13 RX ADMIN — HYDROCODONE BITARTRATE AND ACETAMINOPHEN 1 TABLET: 5; 325 TABLET ORAL at 10:50

## 2021-12-13 NOTE — ED PROVIDER NOTES
EMERGENCY DEPARTMENT HISTORY AND PHYSICAL EXAM          Date: 12/13/2021  Patient Name: Ana Rosa Fournier    History of Presenting Illness     Chief Complaint   Patient presents with    Ankle Pain       History Provided By: Patient    HPI: Ana Rosa Fournier is a 64 y.o. female, pmhx IBS, who presents via private auto to the ED c/o right ankle pain    Patient explains she was walking out the door when she stepped down on her right ankle, her right foot inverted and she went down onto both wrists. She states she heard a snap in her ankle as she fell. She notes this happened approximately 30 minutes ago and has not taken any medications for pain. Pain currently rated at 8/10. Patient states she had similar symptoms happen to her a year ago when she broke her left ankle. She states she landed on both palms but denies any wrist or elbow pain. She did not hit her head and denies any knee or hip pain from the injury. PCP: Leanne Smiley MD    Allergies: Sulfa  Social Hx: -tobacco, -vaping, -EtOH, -Illicit Drugs; There are no other complaints, changes, or physical findings at this time. Current Outpatient Medications   Medication Sig Dispense Refill    naproxen (NAPROSYN) 500 mg tablet Take 1 Tablet by mouth two (2) times daily (with meals). (Patient not taking: Reported on 12/6/2021) 60 Tablet 1    meclizine (ANTIVERT) 25 mg tablet Take  by mouth three (3) times daily as needed for Dizziness. (Patient not taking: Reported on 12/6/2021)      fluticasone propionate (FLONASE) 50 mcg/actuation nasal spray 1 puff twice daily each nostril 1 Bottle 5    dicyclomine (BENTYL) 10 mg capsule Take 1 Cap by mouth three (3) times daily. Indications: irritable colon (Patient not taking: Reported on 12/6/2021) 90 Cap 5    busPIRone (BUSPAR) 10 mg tablet Take 1 Tab by mouth three (3) times daily.  (Patient not taking: Reported on 12/6/2021) 90 Tab 2       Past History     Past Medical History:  Past Medical History: Diagnosis Date    IBS (irritable bowel syndrome)        Past Surgical History:  Past Surgical History:   Procedure Laterality Date    COLONOSCOPY N/A 8/20/2021    COLONOSCOPY performed by Javier De La Cruz MD at [facility] MAIN OR       Family History:  Family History   Problem Relation Age of Onset    Cancer Mother     Diabetes Mother     Cancer Father     Diabetes Father     Breast Cancer Sister        Social History:  Social History     Tobacco Use    Smoking status: Never Smoker    Smokeless tobacco: Never Used   Substance Use Topics    Alcohol use: Not on file    Drug use: Not on file       Allergies: Allergies   Allergen Reactions    Milk Diarrhea    Other Medication Unknown (comments)     Horse serum    Sulfa (Sulfonamide Antibiotics) Other (comments)     Patient states felt \"very sick and had high fever\"         Review of Systems   Review of Systems   Constitutional: Negative for activity change, appetite change, chills, fever and unexpected weight change. HENT: Negative for congestion. Eyes: Negative for pain and visual disturbance. Respiratory: Negative for cough and shortness of breath. Cardiovascular: Negative for chest pain. Gastrointestinal: Negative for abdominal pain, diarrhea, nausea and vomiting. Genitourinary: Negative for dysuria. Musculoskeletal: Positive for arthralgias and joint swelling. Negative for back pain. Skin: Negative for rash. Neurological: Negative for headaches. Physical Exam   Physical Exam  Vitals and nursing note reviewed. Constitutional:       Appearance: She is well-developed. She is not diaphoretic. Comments: [de-identified] middle-aged female currently in minimal acute distress   HENT:      Head: Normocephalic and atraumatic. Eyes:      General:         Right eye: No discharge. Left eye: No discharge. Conjunctiva/sclera: Conjunctivae normal.      Pupils: Pupils are equal, round, and reactive to light. Cardiovascular:      Rate and Rhythm: Normal rate and regular rhythm. Heart sounds: Normal heart sounds. No murmur heard. Pulmonary:      Effort: Pulmonary effort is normal. No respiratory distress. Breath sounds: Normal breath sounds. No wheezing or rales. Abdominal:      General: Bowel sounds are normal. There is no distension. Palpations: Abdomen is soft. Tenderness: There is no abdominal tenderness. Musculoskeletal:         General: Swelling and signs of injury present. No deformity. Normal range of motion. Cervical back: Normal range of motion and neck supple. Comments: Edema right posterior lateral ankle extending to the dorsum of the foot minimally. There is no tenderness of the dorsum of the foot, proximal tibia or proximal fibula. Skin:     General: Skin is warm and dry. Coloration: Skin is not pale. Findings: No erythema or rash. Neurological:      Mental Status: She is alert and oriented to person, place, and time. Cranial Nerves: No cranial nerve deficit. Motor: No abnormal muscle tone. Diagnostic Study Results     Labs -   No results found for this or any previous visit (from the past 12 hour(s)). Radiologic Studies -   XR ANKLE RT MIN 3 V   Final Result   Minimally displaced distal fibular avulsion fracture with overlying   soft tissue swelling. CT Results  (Last 48 hours)    None        CXR Results  (Last 48 hours)    None            Medical Decision Making   I am the first provider for this patient. I reviewed the vital signs, available nursing notes, past medical history, past surgical history, family history and social history. Vital Signs-Reviewed the patient's vital signs.   Patient Vitals for the past 12 hrs:   Temp Pulse Resp BP SpO2   12/13/21 1023 98.3 °F (36.8 °C) 61 17 139/61 97 %       Pulse Oximetry Analysis - 97% on RA    Records Reviewed: Nursing Notes and Old Medical Records    Provider Notes (Medical Decision Making):   MDM: Isolated right lateral malleolar injury from fall. Symptomatic medications with x-ray to be obtained rule out fracture. ED Course:   Initial assessment performed. The patients presenting problems have been discussed, and they are in agreement with the care plan formulated and outlined with them. I have encouraged them to ask questions as they arise throughout their visit. PROGRESS NOTE:  11:00AM  Pt resting comfortably. X-ray reviewed and awaiting final read but I did show patient pictures of the avulsion that is evident on the lateral malleolus. Recommend soft feet at least 48 hours with follow-up in orthopedics clinic. Patient states she has crutches at home and she does not need them here but we will place an Aircast.  She does note she also has a walking boot from her prior fracture. I recommend she follow-up with orthopedics per their recommendations on when its appropriate use the boot. Discharge note:  Pt re-evaluated and noted to be feeling better, ready for discharge. Updated pt on all final x-ray findings. Will follow up as instructed. All questions have been answered, pt voiced understanding and agreement with plan. Narcotics were prescribed, pt was advised not to drive or operate heavy machinery. Specific return precautions provided as well as instructions to return to the ED should sx worsen at any time. Vital signs stable for discharge. Critical Care Time:   0      Diagnosis     Clinical Impression:   1. Closed avulsion fracture of lateral malleolus of right fibula, initial encounter        PLAN:  1. Current Discharge Medication List        2.    Follow-up Information     Follow up With Specialties Details Why Contact Info    Danilo Vidales MD Family Medicine  As needed [address] [place] [address]  [phone]      Julisa Green MD Orthopedic Surgery Schedule an appointment as soon as possible for a visit   [address]  [place] [address] [facility] [address]      [address] [facility] Emergency Medicine  If symptoms worsen [facility]  [phone]        Return to ED if worse     Disposition:  Home       Please note, this dictation was completed with [email or web address], the computer voice recognition software. Quite often unanticipated grammatical, syntax, homophones, and other interpretive errors are inadvertently transcribed by the computer software. Please disregard these errors. Please excuse any errors that have escaped final proof reading.

## 2021-12-13 NOTE — Clinical Note
[address] [place] [address] EMERGENCY DEP  [address] [facility] [address] [place] [address]  [phone]    Work/School Note    Date: 12/13/2021    To Whom It May concern:    Rocco Raines was seen and treated today in the emergency room by the following provider(s):  Attending Provider: Huy Sandra MD.      Rocco Raines is excused from work/school on 12/13/21 and 12/14/21. She is medically clear to return to work/school on 12/15/2021. Sincerely,          Dinorah Bradshaw.  MD Vinny

## 2021-12-15 ENCOUNTER — OFFICE VISIT (OUTPATIENT)
Dept: FAMILY MEDICINE CLINIC | Age: 56
End: 2021-12-15
Payer: MEDICAID

## 2021-12-15 VITALS
BODY MASS INDEX: 39.85 KG/M2 | DIASTOLIC BLOOD PRESSURE: 66 MMHG | SYSTOLIC BLOOD PRESSURE: 113 MMHG | OXYGEN SATURATION: 99 % | RESPIRATION RATE: 18 BRPM | HEART RATE: 79 BPM | WEIGHT: 203 LBS | HEIGHT: 60 IN | TEMPERATURE: 98.4 F

## 2021-12-15 DIAGNOSIS — S82.891A CLOSED AVULSION FRACTURE OF RIGHT ANKLE, INITIAL ENCOUNTER: Primary | ICD-10-CM

## 2021-12-15 DIAGNOSIS — T14.8XXA BROKEN BONES: ICD-10-CM

## 2021-12-15 PROCEDURE — 99213 OFFICE O/P EST LOW 20 MIN: CPT | Performed by: NURSE PRACTITIONER

## 2021-12-15 NOTE — PROGRESS NOTES
1. Have you been to the ER, urgent care clinic since your last visit? Hospitalized since your last visit? yes    2. Have you seen or consulted any other health care providers outside of the [address] [place] [address] since your last visit? Include any pap smears or colon screening.  no

## 2021-12-15 NOTE — PROGRESS NOTES
An Suresh is a 64 y.o. female who presents today with c/o   Chief Complaint   Patient presents with    Follow-up     needs referral           Assessment/ Plan:   Avulsion fracture to right ankle   Referral to orthopedics placed  F/U: As needed    History of broken bones  Order placed for Dexa Scan  She will find out if her insurance will cover this testing  F/U: As needed          Orders Placed This Encounter    DEXA BONE DENSITY STUDY AXIAL     Standing Status:   Future     Standing Expiration Date:   1/15/2023    REFERRAL TO ORTHOPEDICS     Referral Priority:   Routine     Referral Type:   Consultation     Referral Reason:   Specialty Services Required     Referred to Provider:   Yaneth Odell MD     Number of Visits Requested:   1           Subjective:    Seen in the ER 12/13/21 for right ankle pain after she fell on her ankle. Right ankle XR->minimally displaced distal fibular avulsion fracture with overlying soft tissue swelling. Discharged home and given Norco #10. She was told to F/U with Dr. Price Arnett. [facility] for [facility], but was told by her insurance company that she needs to have the referral from her PCP. Referral placed today. She has broken her left ankle in the past and is concerned about her bone health. She is going to start taking vitamin D and calcium. Will order dexa scan, but recommend patient call her insurance company to make sure it will be paid for since she is not 72years old. Patient agrees with plan of care.      Patient Active Problem List   Diagnosis Code    Irritable bowel syndrome without diarrhea K58.9    Gastroesophageal reflux disease without esophagitis K21.9    Generalized abdominal pain R10.84    Left carpal tunnel syndrome G56.02    KEY (generalized anxiety disorder) F41.1    Class 1 obesity due to excess calories with body mass index (BMI) of 31.0 to 31.9 in adult E66.09, Z68.31       Past Medical History:   Diagnosis Date    IBS (irritable bowel syndrome)          Current Outpatient Medications:     HYDROcodone-acetaminophen (Norco) 5-325 mg per tablet, Take 1 Tablet by mouth every six (6) hours as needed for Pain for up to 3 days. Max Daily Amount: 4 Tablets. (Patient not taking: Reported on 12/15/2021), Disp: 10 Tablet, Rfl: 0    ibuprofen (MOTRIN) 600 mg tablet, Take 1 Tablet by mouth every six (6) hours as needed for Pain. (Patient not taking: Reported on 12/15/2021), Disp: 20 Tablet, Rfl: 0    fluticasone propionate (FLONASE) 50 mcg/actuation nasal spray, 1 puff twice daily each nostril (Patient not taking: Reported on 12/15/2021), Disp: 1 Bottle, Rfl: 5    Allergies   Allergen Reactions    Milk Diarrhea    Other Medication Unknown (comments)     Horse serum    Sulfa (Sulfonamide Antibiotics) Other (comments)     Patient states felt \"very sick and had high fever\"       Past Surgical History:   Procedure Laterality Date    COLONOSCOPY N/A 8/20/2021    COLONOSCOPY performed by Salvador De La Rosa MD at [address] [place] [address] History     Tobacco Use   Smoking Status Never Smoker   Smokeless Tobacco Never Used       Social History     Socioeconomic History    Marital status:    Tobacco Use    Smoking status: Never Smoker    Smokeless tobacco: Never Used       Family History   Problem Relation Age of Onset    Cancer Mother     Diabetes Mother     Cancer Father     Diabetes Father     Breast Cancer Sister        ROS:  Review of Systems   Constitutional: Negative for chills, fever and weight loss. HENT: Negative for hearing loss and tinnitus. Eyes: Negative for blurred vision, double vision and photophobia. Respiratory: Negative for cough, sputum production and shortness of breath. Cardiovascular: Negative for chest pain and palpitations. Gastrointestinal: Negative for abdominal pain, heartburn, nausea and vomiting. Genitourinary: Negative for dysuria and urgency.    Musculoskeletal:        Minimally displaced distal fibular avulsion fracture   Skin: Negative for itching and rash. Neurological: Negative for dizziness and headaches. Endo/Heme/Allergies: Does not bruise/bleed easily. Objective:     Visit Vitals  /66 (BP 1 Location: Left arm)   Pulse 79   Temp 98.4 °F (36.9 °C)   Resp 18   Ht 5' (1.524 m)   Wt 203 lb (92.1 kg)   SpO2 99%   BMI 39.65 kg/m²     Body mass index is 39.65 kg/m². Physical Exam  Vitals reviewed. HENT:      Nose: Nose normal.      Mouth/Throat:      Mouth: Mucous membranes are moist.   Eyes:      Extraocular Movements: Extraocular movements intact. Conjunctiva/sclera: Conjunctivae normal.      Pupils: Pupils are equal, round, and reactive to light. Cardiovascular:      Rate and Rhythm: Normal rate and regular rhythm. Pulses: Normal pulses. Heart sounds: Normal heart sounds. Pulmonary:      Effort: Pulmonary effort is normal.      Breath sounds: Normal breath sounds. Abdominal:      General: Abdomen is flat. Palpations: Abdomen is soft. Musculoskeletal:         General: Normal range of motion. Cervical back: Normal range of motion. Right ankle: Swelling present. Legs:    Skin:     General: Skin is warm and dry. Neurological:      Mental Status: She is alert and oriented to person, place, and time. Psychiatric:         Mood and Affect: Mood normal.               No results found for this visit on 12/15/21. Verbal and written instructions (see AVS) provided. Patient expresses understanding of diagnosis and treatment plan. Patient has been advised to contact practice or seek care if condition persists or worsens.      Genna Spaulding NP

## 2021-12-16 ENCOUNTER — DOCUMENTATION ONLY (OUTPATIENT)
Dept: FAMILY MEDICINE CLINIC | Age: 56
End: 2021-12-16

## 2022-02-09 ENCOUNTER — OFFICE VISIT (OUTPATIENT)
Dept: FAMILY MEDICINE CLINIC | Age: 57
End: 2022-02-09
Payer: MEDICAID

## 2022-02-09 VITALS
RESPIRATION RATE: 18 BRPM | HEIGHT: 60 IN | HEART RATE: 63 BPM | DIASTOLIC BLOOD PRESSURE: 81 MMHG | BODY MASS INDEX: 42.26 KG/M2 | TEMPERATURE: 97.8 F | WEIGHT: 215.25 LBS | SYSTOLIC BLOOD PRESSURE: 131 MMHG | OXYGEN SATURATION: 99 %

## 2022-02-09 DIAGNOSIS — M79.601 RIGHT ARM PAIN: Primary | ICD-10-CM

## 2022-02-09 DIAGNOSIS — Z87.81 HISTORY OF ANKLE FRACTURE: ICD-10-CM

## 2022-02-09 DIAGNOSIS — M79.641 RIGHT HAND PAIN: ICD-10-CM

## 2022-02-09 PROBLEM — E78.2 MIXED HYPERLIPIDEMIA: Chronic | Status: ACTIVE | Noted: 2022-02-09

## 2022-02-09 PROCEDURE — 99214 OFFICE O/P EST MOD 30 MIN: CPT | Performed by: NURSE PRACTITIONER

## 2022-02-09 RX ORDER — PREDNISONE 10 MG/1
TABLET ORAL
Qty: 21 TABLET | Refills: 0 | Status: SHIPPED | OUTPATIENT
Start: 2022-02-09 | End: 2022-03-15 | Stop reason: ALTCHOICE

## 2022-02-09 NOTE — PROGRESS NOTES
1. Have you been to the ER, urgent care clinic since your last visit? Hospitalized since your last visit? No    2. Have you seen or consulted any other health care providers outside of the [address] [place] [address] since your last visit? Include any pap smears or colon screening.  No     Chief Complaint   Patient presents with    Joint Pain     mainly on right arm     Visit Vitals  /81 (BP 1 Location: Left arm, BP Patient Position: Sitting)   Pulse 63   Temp 97.8 °F (36.6 °C) (Temporal)   Resp 18   Ht 5' (1.524 m)   Wt 215 lb 4 oz (97.6 kg)   SpO2 99%   BMI 42.04 kg/m²

## 2022-02-09 NOTE — PROGRESS NOTES
Subjective:     Chief Complaint   Patient presents with    Joint Pain     mainly on right arm       Adrian Caro is a 62 y.o. female who presents today with c/o chronic pain in the joints of her right hand. She also has pain in the right forearm, elbow, and shoulder. Denies any significant pain in the neck but her neck does feel stiff when she turns it from side to side. She also reports numbness and tingling and weakness in both hands. Hx of carpal tunnel in the left hand. Pain started 2 months ago. The pain wakes her up at night. The pain in her arm is aggaravated by lifting weights. She has been trying to work out at home but cannot due to the pain in her arm and hand. She will occasionally take an ibuprofen but it does not help much. Also uses topical lidocaine. She has been wearing a wrist splint to work- feels better when it is on. She is a  so does a lot of typing daily. She mentions she did fracture her right ankle on 12-13-21. (Saw ortho Dr. Misty Christine) and also has a hx of left ankle fx so she is going to have a DEXA scan done soon. She actually has not scheduled it yet so I gave her the scheduling department phone number to call. She is taking vitamin D and calcium supplements OTC. Has not had any labwork done since 4-2021. She plans to do this another day, has appt 3-15-22. She does not smoke. Mother has osteopenia but no OP. Denies chronic steroid use.  Hx of severe TMJ as a teen     Patient Active Problem List   Diagnosis Code    Irritable bowel syndrome without diarrhea K58.9    Gastroesophageal reflux disease without esophagitis K21.9    Generalized abdominal pain R10.84    Left carpal tunnel syndrome G56.02    KEY (generalized anxiety disorder) F41.1    Class 1 obesity due to excess calories with body mass index (BMI) of 31.0 to 31.9 in adult E66.09, Z68.31       Past Medical History:   Diagnosis Date    IBS (irritable bowel syndrome)        No current outpatient medications on file. Allergies   Allergen Reactions    Milk Diarrhea    Other Medication Unknown (comments)     Horse serum    Sulfa (Sulfonamide Antibiotics) Other (comments)     Patient states felt \"very sick and had high fever\"       Past Surgical History:   Procedure Laterality Date    COLONOSCOPY N/A 8/20/2021    COLONOSCOPY performed by Champ Jones MD at [facility] MAIN OR       Social History     Tobacco Use   Smoking Status Never Smoker   Smokeless Tobacco Never Used       Social History     Socioeconomic History    Marital status: SINGLE   Tobacco Use    Smoking status: Never Smoker    Smokeless tobacco: Never Used       Family History   Problem Relation Age of Onset    Cancer Mother     Diabetes Mother     Cancer Father     Diabetes Father     Breast Cancer Sister        ROS:  Gen: denies fever, chills, or fatigue  HEENT:denies H/A, nasal congestion, or sore throat  Resp: denies dyspnea, cough, or wheezing  CV: denies chest pain, pressure, or palpitations  Extremeties: denies edema, pallor, or cyanosis  Musculoskeletal: +chronic joint pain in right hand, right forearm, elbow, and shoulder, +joint swelling in hands denies neck pain or muscle cramps  Neuro: +numbness/tingling and weakness in both hands, denies tremor or dizziness  Skin: denies rashes or new lesions     Objective:     Visit Vitals  /81 (BP 1 Location: Left arm, BP Patient Position: Sitting)   Pulse 63   Temp 97.8 °F (36.6 °C) (Temporal)   Resp 18   Ht 5' (1.524 m)   Wt 215 lb 4 oz (97.6 kg)   SpO2 99%   BMI 42.04 kg/m²     Body mass index is 42.04 kg/m². General: Alert and oriented. No acute distress. +Obese. HEENT :  Eyes: Sclera white, conjunctiva clear. PERRLA. Extra ocular movements intact. Neck: +buffalo hump present. No TTP, FROM but feels a \"pulling sensation\" with bilateral rotation. Lungs: Breathing even and unlabored.  All lobes clear to auscultation bilaterally   Heart :RRR, S1 and S2 normal intensity, no extra heart sounds  Extremities: Non-edematous, no pallor or cyanosis. Bilat. Radial pulses 2+  Musculoskeletal: R hand: +joint swelling in the fingers, no heat or erythema, FROM. R wrist: No TTP, heat, erythema, or swelling. FROM. Neg Tinel's sign   R elbow: No TTP, heat, erythema, or swelling. FROM  R shoulder: No TTP, FROM but has pain with internal and external rotation   Neuro: Cranial nerves grossly normal.  Sensory: Sensation intact. Motor: Strength 5 over 5 and symmetrical in BUE's. Good  strength. No tremor. Psych: Mood and thought content appropriate for situation. Dressed appropriately and with good hygiene. Skin: Warm, dry, and intact. No lesions or discoloration. Assessment/ Plan:     R hand pain  Suspect OA due to joint swelling  May also have carpal tunnel syndrome- advised to cont to wear splint  Get xray of right hand- will call with results  Start prednisone 10mg- take 6 pills today then take 1 less pill every day until gone (#21, 0R)  Will have her start a daily NSAID once prednisone is complete  Discussed option of following up with ortho for possible joint injection  Discussed option of PT  F/U pending results    R arm pain   Suspect cervical radiculopathy  Get xray of cervical spine- will call with results  Start prednisone 10mg- take 6 pills today then take 1 less pill every day until gone (#21, 0R)  Will have her start a daily NSAID once prednisone is complete  Discussed option of PT  F/U pending results    Hx of recurrent fractures  Advised to call and scheduled DEXA scan  Cont vit D and calcium supplements  Avoid smoking  F/U as scheduled with [facility] on 3-15-22 for bloodwork          Verbal and written instructions (see AVS) provided.  Patient expresses understanding of diagnosis and treatment plan. Health Maintenance Due   Topic Date Due    Flu Vaccine (1) Never done               nAita Fraire.  Belkis Hutchins NP

## 2022-02-09 NOTE — PROGRESS NOTES
Orin Hope is a 62 y.o. female who presents today with c/o   Chief Complaint   Patient presents with    Joint Pain    Cough     during the day at times. Assessment/ Plan:     GERD  Avoid spicy and citreus foods, caffeine, and alcohol. Eat smaller, more frequent meals. Do not lay down to go to sleep for at least 2 hours after eating. Prop head of bed up on blocks to keep head elevated. Avoid wearing tight fitting pants and belts. Avoid putting continuous pressure on the stomach such as prolonged bending and squatting. Contact healthcare provider if symptoms worsen or fail to improve. F/U: 6 months    Healthcare maintenance  Check labs  F/U: 6 months     Mixed hyperlipidemia  Check labs  Low fat diet  F/U: 6 months    H/O vitamin D deficiency  Cont vitamin D supplement  F/U: 6 months    Osteopenia  Cont Vit D and calcium supplements  F/U: 6 months    Orders Placed This Encounter    COLLECTION VENOUS BLOOD,VENIPUNCTURE    METABOLIC PANEL, COMPREHENSIVE     Standing Status:   Future     Number of Occurrences:   1     Standing Expiration Date:   3/15/2023    CBC WITH AUTOMATED DIFF     Standing Status:   Future     Number of Occurrences:   1     Standing Expiration Date:   3/15/2023    LIPID PANEL     Standing Status:   Future     Number of Occurrences:   1     Standing Expiration Date:   3/15/2023    VITAMIN D, 25 HYDROXY     Standing Status:   Future     Number of Occurrences:   1     Standing Expiration Date:   3/15/2023       Follow-up and Dispositions    · Return in about 6 months (around 9/15/2022). Subjective:  GERD  Reports history of GERD. She is not on any prescription drugs at this time. States years ago she had an xray in the past that showed some fluid around her heart. She denies chest pain, but has a chronic cough related to the GERD. If she develops chest pain or a worsening cough then we will order an xray.      Healthcare maintenance  She would like labs performed today. Will obtain CBC, CMP, vitamin D and lipid panel  Mixed Hyperlipidemia  Working to loose weight but she injured her right ankle and has been unable to go walking. Weight is up 3# in the last month. She plans to work on diet and exercise. Will check lipid panel. H/O Vitamin D Deficiency  She takes Vit D and calcium since she is allergic to milk. Will check Vitamin D level today. Osteopenia  Recent Dexa 3/1/22-> osteopenic change in the right femoral neck and left femoral neck. She does not have osteoporosis. Family history of osteopenia but not osteoporosis. Taking Vit D and calcium supplements. Patient Active Problem List   Diagnosis Code    Irritable bowel syndrome without diarrhea K58.9    Gastroesophageal reflux disease without esophagitis K21.9    Generalized abdominal pain R10.84    Left carpal tunnel syndrome G56.02    KEY (generalized anxiety disorder) F41.1    Class 1 obesity due to excess calories with body mass index (BMI) of 31.0 to 31.9 in adult E66.09, Z68.31    Mixed hyperlipidemia E78.2    DDD (degenerative disc disease), cervical M50.30       Past Medical History:   Diagnosis Date    IBS (irritable bowel syndrome)          Current Outpatient Medications:     inulin-vitamin D3 (Fiber Gummies with Vitamin D3) 2,500 mg- 500 unit chew, Take 1 Tablet by mouth daily. OTC, Disp: 30 Each, Rfl: 0    Magnesium Oxide 500 mg cap, Take 1 Capsule by mouth daily. OTC, Disp: 30 Capsule, Rfl: 0    aspirin delayed-release 81 mg tablet, Take 1 Tablet by mouth daily. OTC, Disp: 30 Tablet, Rfl: 0    Bacillus coagulans (Digestive Advantage Prob Gummy) 250 million cell chew, Take 1 Tablet by mouth daily. OTC, Disp: 30 Tablet, Rfl: 0    rhubarb root extract (Estroven Cmplt Menopause Rlf) 4 mg tab, Take 1 Tablet by mouth daily. OTC (exact dose unknown), Disp: 30 Each, Rfl: 0    Fish,Saf,Flx,Brg Oils-O3,6,9#2 (Fish-Flax-Borage Oil) [phone] mg cap, Take 1 Capsule by mouth daily.  OTC (exact dose unknown), Disp: 30 Capsule, Rfl: 0    glucosam-chond-hyalu-CF borate (Move Free [place]) 750 mg-100 mg- 1.65 mg-108 mg tab, 1 tab daily (OTC) exact dose unknown, Disp: 30 Tablet, Rfl: 0    Allergies   Allergen Reactions    Milk Diarrhea    Other Medication Unknown (comments)     Horse serum    Sulfa (Sulfonamide Antibiotics) Other (comments)     Patient states felt \"very sick and had high fever\"       Past Surgical History:   Procedure Laterality Date    COLONOSCOPY N/A 8/20/2021    COLONOSCOPY performed by Hiram Johansen MD at [address] History     Tobacco Use   Smoking Status Never Smoker   Smokeless Tobacco Never Used       Social History     Socioeconomic History    Marital status: SINGLE   Tobacco Use    Smoking status: Never Smoker    Smokeless tobacco: Never Used       Family History   Problem Relation Age of Onset    Cancer Mother     Diabetes Mother     Cancer Father     Diabetes Father     Breast Cancer Sister        ROS:  Review of Systems   Constitutional: Negative for chills, fever and weight loss. HENT: Negative for hearing loss and tinnitus. Eyes: Negative for blurred vision and double vision. Respiratory: Positive for cough (related to GERD). Cardiovascular: Negative for chest pain and palpitations. Gastrointestinal: Positive for heartburn (history of GERD). Negative for nausea and vomiting. Genitourinary: Negative for dysuria and urgency. Musculoskeletal: Positive for joint pain (taking glucosamine). Negative for myalgias. Skin: Negative for rash. Neurological: Negative for dizziness and headaches. Psychiatric/Behavioral: Negative for depression and suicidal ideas.          Objective:     Visit Vitals  BP (!) 128/56 (BP 1 Location: Left arm, BP Patient Position: Sitting)   Pulse 66   Temp 97.4 °F (36.3 °C) (Temporal)   Resp 18   Ht 5' (1.524 m)   Wt 218 lb 2 oz (98.9 kg)   SpO2 100%   BMI 42.60 kg/m²     Body mass index is 42.6 kg/m². Physical Exam  Vitals reviewed. Constitutional:       Appearance: Normal appearance. She is obese. HENT:      Head: Normocephalic. Right Ear: External ear normal.      Left Ear: External ear normal.      Nose: Nose normal.      Mouth/Throat:      Mouth: Mucous membranes are moist.      Pharynx: Oropharynx is clear. Eyes:      Extraocular Movements: Extraocular movements intact. Conjunctiva/sclera: Conjunctivae normal.      Pupils: Pupils are equal, round, and reactive to light. Neck:      Comments: limited ROM due to pain-[facility]  Cardiovascular:      Rate and Rhythm: Normal rate and regular rhythm. Pulses: Normal pulses. Heart sounds: Normal heart sounds. Pulmonary:      Effort: Pulmonary effort is normal.      Breath sounds: Normal breath sounds. Abdominal:      General: Abdomen is flat. Musculoskeletal:         General: Normal range of motion. Skin:     General: Skin is warm. Neurological:      Mental Status: She is alert and oriented to person, place, and time. No results found for this visit on 03/15/22. Verbal and written instructions (see AVS) provided. Patient expresses understanding of diagnosis and treatment plan. Follow-up and Dispositions    · Return in about 6 months (around 9/15/2022). Patient has been advised to contact practice or seek care if condition persists or worsens.      Kimberly Lopez NP

## 2022-02-10 RX ORDER — OMEGA-3 FATTY ACIDS/FISH OIL 300-1000MG
1 CAPSULE ORAL DAILY
Qty: 30 CAPSULE | Refills: 0
Start: 2022-02-10 | End: 2022-06-14

## 2022-02-10 RX ORDER — SOY ISOFLAV/BCOHOSH/CISSUS QUA 198 MG
1 CAPSULE ORAL DAILY
Qty: 30 EACH | Refills: 0
Start: 2022-02-10 | End: 2022-06-14

## 2022-02-10 RX ORDER — ZINC GLUCONATE 50 MG
1 TABLET ORAL DAILY
Qty: 30 CAPSULE | Refills: 0
Start: 2022-02-10 | End: 2022-06-14

## 2022-02-10 RX ORDER — ASPIRIN 81 MG/1
81 TABLET ORAL DAILY
Qty: 30 TABLET | Refills: 0
Start: 2022-02-10 | End: 2022-06-14 | Stop reason: ALTCHOICE

## 2022-02-10 RX ORDER — INULIN/CHOLECALCIFEROL (D3) 2500MG-500
1 TABLET,CHEWABLE ORAL DAILY
Qty: 30 EACH | Refills: 0
Start: 2022-02-10

## 2022-02-10 RX ORDER — YEAST,DRIED (S. CEREVISIAE)
POWDER (GRAM) ORAL
Qty: 30 TABLET | Refills: 0
Start: 2022-02-10

## 2022-02-10 RX ORDER — BACILLUS COAGULANS 250MM CELL
1 TABLET,CHEWABLE ORAL DAILY
Qty: 30 TABLET | Refills: 0
Start: 2022-02-10

## 2022-03-01 ENCOUNTER — HOSPITAL ENCOUNTER (OUTPATIENT)
Dept: GENERAL RADIOLOGY | Age: 57
Discharge: HOME | End: 2022-03-01
Attending: NURSE PRACTITIONER
Payer: MEDICAID

## 2022-03-01 DIAGNOSIS — M79.601 RIGHT ARM PAIN: ICD-10-CM

## 2022-03-01 DIAGNOSIS — T14.8XXA BROKEN BONES: ICD-10-CM

## 2022-03-01 DIAGNOSIS — M79.641 RIGHT HAND PAIN: ICD-10-CM

## 2022-03-01 PROCEDURE — 77080 DXA BONE DENSITY AXIAL: CPT

## 2022-03-01 PROCEDURE — 72050 X-RAY EXAM NECK SPINE 4/5VWS: CPT

## 2022-03-01 PROCEDURE — 73130 X-RAY EXAM OF HAND: CPT

## 2022-03-02 PROBLEM — M50.30 DDD (DEGENERATIVE DISC DISEASE), CERVICAL: Chronic | Status: ACTIVE | Noted: 2022-03-02

## 2022-03-02 NOTE — PROGRESS NOTES
She has degenerative joint disease of the cervical spine which can cause pinched nerve in the arm and cause arm pain.  Would recommend PT

## 2022-03-02 NOTE — PROGRESS NOTES
There is no arthritis in the hand but there is some soft tissue swelling. Most likely related to inflammation which the prednisone should improve. She does have a good bit of arthritis in the neck which I believe is causing the pain in her right arm and possibly the hand too. I would recommend PT for this.  Let me know if she is interested and will place referral

## 2022-03-03 ENCOUNTER — PATIENT MESSAGE (OUTPATIENT)
Dept: FAMILY MEDICINE CLINIC | Age: 57
End: 2022-03-03

## 2022-03-03 DIAGNOSIS — M79.641 RIGHT HAND PAIN: ICD-10-CM

## 2022-03-03 DIAGNOSIS — M79.601 RIGHT ARM PAIN: Primary | ICD-10-CM

## 2022-03-03 NOTE — PROGRESS NOTES
My chart message sent to patient. DEXA scan is within normal limits. We will discuss the findings on her follow-up appt scheduled 3/15/22. Continue taking vitamin D and calcium supplements OTC.

## 2022-03-03 NOTE — TELEPHONE ENCOUNTER
Spoke with patient over the phone regarding her DEXA scan results. She does have osteopenic change in the right femoral neck and left femoral neck. She does not have osteoporosis at this time. Family history of osteopenia but not osteoporosis. Recommended she continue taking her calcium and Vit D. Will review further on follow-up appt 3/15/22.

## 2022-03-04 ENCOUNTER — DOCUMENTATION ONLY (OUTPATIENT)
Dept: FAMILY MEDICINE CLINIC | Age: 57
End: 2022-03-04

## 2022-03-15 ENCOUNTER — OFFICE VISIT (OUTPATIENT)
Dept: FAMILY MEDICINE CLINIC | Age: 57
End: 2022-03-15
Payer: MEDICAID

## 2022-03-15 VITALS
SYSTOLIC BLOOD PRESSURE: 128 MMHG | RESPIRATION RATE: 18 BRPM | DIASTOLIC BLOOD PRESSURE: 56 MMHG | WEIGHT: 218.13 LBS | TEMPERATURE: 97.4 F | OXYGEN SATURATION: 100 % | HEART RATE: 66 BPM | BODY MASS INDEX: 42.82 KG/M2 | HEIGHT: 60 IN

## 2022-03-15 DIAGNOSIS — Z86.39 H/O VITAMIN D DEFICIENCY: ICD-10-CM

## 2022-03-15 DIAGNOSIS — Z00.00 HEALTHCARE MAINTENANCE: Primary | ICD-10-CM

## 2022-03-15 DIAGNOSIS — K21.9 GASTROESOPHAGEAL REFLUX DISEASE WITHOUT ESOPHAGITIS: ICD-10-CM

## 2022-03-15 DIAGNOSIS — M85.80 OSTEOPENIA, UNSPECIFIED LOCATION: ICD-10-CM

## 2022-03-15 DIAGNOSIS — E78.2 MIXED HYPERLIPIDEMIA: ICD-10-CM

## 2022-03-15 PROCEDURE — 36415 COLL VENOUS BLD VENIPUNCTURE: CPT | Performed by: NURSE PRACTITIONER

## 2022-03-15 PROCEDURE — 99396 PREV VISIT EST AGE 40-64: CPT | Performed by: NURSE PRACTITIONER

## 2022-03-15 NOTE — PROGRESS NOTES
1. \"Have you been to the ER, urgent care clinic since your last visit? Hospitalized since your last visit? \" No    2. \"Have you seen or consulted any other health care providers outside of the [address] [place] [address] since your last visit? \" PT, Mahad    3. For patients aged 39-70: Has the patient had a colonoscopy / FIT/ Cologuard? Yes - no Care Gap present      If the patient is female:    4. For patients aged 41-77: Has the patient had a mammogram within the past 2 years? Yes - no Care Gap present      5. For patients aged 21-65: Has the patient had a pap smear? Yes - no Care Gap present      No chief complaint on file.     Visit Vitals  BP (!) 128/56 (BP 1 Location: Left arm, BP Patient Position: Sitting)   Pulse 66   Temp 97.4 °F (36.3 °C) (Temporal)   Resp 18   Ht 5' (1.524 m)   Wt 218 lb 2 oz (98.9 kg)   SpO2 100%   BMI 42.60 kg/m²

## 2022-03-16 DIAGNOSIS — Z86.39 H/O VITAMIN D DEFICIENCY: Primary | ICD-10-CM

## 2022-03-16 DIAGNOSIS — R63.5 WEIGHT GAIN: ICD-10-CM

## 2022-03-16 LAB
25(OH)D3 SERPL-MCNC: 26.5 NG/ML (ref 30–100)
ALBUMIN SERPL-MCNC: 3.6 G/DL (ref 3.5–5)
ALBUMIN/GLOB SERPL: 1.1 {RATIO} (ref 1.1–2.2)
ALP SERPL-CCNC: 114 U/L (ref 45–117)
ALT SERPL-CCNC: 29 U/L (ref 12–78)
ANION GAP SERPL CALC-SCNC: 2 MMOL/L (ref 5–15)
AST SERPL-CCNC: 8 U/L (ref 15–37)
BASOPHILS # BLD: 0.1 K/UL (ref 0–0.1)
BASOPHILS NFR BLD: 1 % (ref 0–1)
BILIRUB SERPL-MCNC: 0.6 MG/DL (ref 0.2–1)
BUN SERPL-MCNC: 17 MG/DL (ref 6–20)
BUN/CREAT SERPL: 18 (ref 12–20)
CALCIUM SERPL-MCNC: 8.9 MG/DL (ref 8.5–10.1)
CHLORIDE SERPL-SCNC: 111 MMOL/L (ref 97–108)
CHOLEST SERPL-MCNC: 256 MG/DL
CO2 SERPL-SCNC: 27 MMOL/L (ref 21–32)
CREAT SERPL-MCNC: 0.93 MG/DL (ref 0.55–1.02)
DIFFERENTIAL METHOD BLD: NORMAL
EOSINOPHIL # BLD: 0.1 K/UL (ref 0–0.4)
EOSINOPHIL NFR BLD: 2 % (ref 0–7)
ERYTHROCYTE [DISTWIDTH] IN BLOOD BY AUTOMATED COUNT: 12.2 % (ref 11.5–14.5)
GLOBULIN SER CALC-MCNC: 3.3 G/DL (ref 2–4)
GLUCOSE SERPL-MCNC: 108 MG/DL (ref 65–100)
HCT VFR BLD AUTO: 42.6 % (ref 35–47)
HDLC SERPL-MCNC: 33 MG/DL
HDLC SERPL: 7.8 {RATIO} (ref 0–5)
HGB BLD-MCNC: 14.4 G/DL (ref 11.5–16)
IMM GRANULOCYTES # BLD AUTO: 0 K/UL (ref 0–0.04)
IMM GRANULOCYTES NFR BLD AUTO: 0 % (ref 0–0.5)
LDLC SERPL CALC-MCNC: 161.8 MG/DL (ref 0–100)
LYMPHOCYTES # BLD: 1.8 K/UL (ref 0.8–3.5)
LYMPHOCYTES NFR BLD: 23 % (ref 12–49)
MCH RBC QN AUTO: 31.6 PG (ref 26–34)
MCHC RBC AUTO-ENTMCNC: 33.8 G/DL (ref 30–36.5)
MCV RBC AUTO: 93.6 FL (ref 80–99)
MONOCYTES # BLD: 0.4 K/UL (ref 0–1)
MONOCYTES NFR BLD: 6 % (ref 5–13)
NEUTS SEG # BLD: 5.2 K/UL (ref 1.8–8)
NEUTS SEG NFR BLD: 68 % (ref 32–75)
NRBC # BLD: 0 K/UL (ref 0–0.01)
NRBC BLD-RTO: 0 PER 100 WBC
PLATELET # BLD AUTO: 220 K/UL (ref 150–400)
PMV BLD AUTO: 11.6 FL (ref 8.9–12.9)
POTASSIUM SERPL-SCNC: 4.1 MMOL/L (ref 3.5–5.1)
PROT SERPL-MCNC: 6.9 G/DL (ref 6.4–8.2)
RBC # BLD AUTO: 4.55 M/UL (ref 3.8–5.2)
SODIUM SERPL-SCNC: 140 MMOL/L (ref 136–145)
TRIGL SERPL-MCNC: 306 MG/DL (ref ?–150)
VLDLC SERPL CALC-MCNC: 61.2 MG/DL
WBC # BLD AUTO: 7.6 K/UL (ref 3.6–11)

## 2022-03-16 NOTE — PROGRESS NOTES
Spoke with patient over the phone. Patient made aware of labs. Advised patient to take her current OTC vitamin D supplement. She has another vitamin D supplement she would like to start taking. We will recheck her Vitamin D level in 3 months-order in Epic. Patient agrees with plan of care. If vitamin D level does not improve then we will send in an rx. She was also advised to cut back on her bread intake. Her triglycerides are very elevated. We will monitor her lipid panel after she starts exercising more and improving her diet.

## 2022-03-18 PROBLEM — M50.30 DDD (DEGENERATIVE DISC DISEASE), CERVICAL: Status: ACTIVE | Noted: 2022-03-02

## 2022-03-18 PROBLEM — K21.9 GASTROESOPHAGEAL REFLUX DISEASE WITHOUT ESOPHAGITIS: Status: ACTIVE | Noted: 2021-04-09

## 2022-03-19 PROBLEM — E78.2 MIXED HYPERLIPIDEMIA: Status: ACTIVE | Noted: 2022-02-09

## 2022-03-19 PROBLEM — F41.1 GAD (GENERALIZED ANXIETY DISORDER): Status: ACTIVE | Noted: 2021-04-09

## 2022-03-19 PROBLEM — R10.84 GENERALIZED ABDOMINAL PAIN: Status: ACTIVE | Noted: 2021-04-09

## 2022-03-19 PROBLEM — E66.09 CLASS 1 OBESITY DUE TO EXCESS CALORIES WITH BODY MASS INDEX (BMI) OF 31.0 TO 31.9 IN ADULT: Status: ACTIVE | Noted: 2021-04-09

## 2022-03-19 PROBLEM — G56.02 LEFT CARPAL TUNNEL SYNDROME: Status: ACTIVE | Noted: 2021-04-09

## 2022-03-19 PROBLEM — E66.811 CLASS 1 OBESITY DUE TO EXCESS CALORIES WITH BODY MASS INDEX (BMI) OF 31.0 TO 31.9 IN ADULT: Status: ACTIVE | Noted: 2021-04-09

## 2022-03-19 PROBLEM — K58.9 IRRITABLE BOWEL SYNDROME WITHOUT DIARRHEA: Status: ACTIVE | Noted: 2021-04-09

## 2022-05-19 ENCOUNTER — PATIENT MESSAGE (OUTPATIENT)
Dept: FAMILY MEDICINE CLINIC | Age: 57
End: 2022-05-19

## 2022-05-23 NOTE — TELEPHONE ENCOUNTER
From: Deb Mae  To: [facility] at [address]: 5/19/2022 9:37 AM EDT  Subject: Blood type     Where in my MyChart notes can I find out my blood type?

## 2022-05-26 DIAGNOSIS — R05.9 COUGH: Primary | ICD-10-CM

## 2022-05-27 ENCOUNTER — HOSPITAL ENCOUNTER (OUTPATIENT)
Dept: GENERAL RADIOLOGY | Age: 57
Discharge: HOME | End: 2022-05-27
Payer: MEDICAID

## 2022-05-27 DIAGNOSIS — R05.9 COUGH: ICD-10-CM

## 2022-05-27 PROCEDURE — 71046 X-RAY EXAM CHEST 2 VIEWS: CPT

## 2022-06-13 ENCOUNTER — HOSPITAL ENCOUNTER (OUTPATIENT)
Dept: MAMMOGRAPHY | Age: 57
Discharge: HOME | End: 2022-06-13
Attending: FAMILY MEDICINE
Payer: MEDICAID

## 2022-06-13 DIAGNOSIS — Z12.31 VISIT FOR SCREENING MAMMOGRAM: ICD-10-CM

## 2022-06-13 PROCEDURE — 77063 BREAST TOMOSYNTHESIS BI: CPT

## 2022-06-13 NOTE — PROGRESS NOTES
Teresa Fuentes is a 62 y.o. female who presents today with c/o   Chief Complaint   Patient presents with    Rash    Vitamin D Deficiency     follow-up           Assessment/ Plan:     GERD  Avoid spicy and citreus foods, caffeine, and alcohol. Eat smaller, more frequent meals. Do not lay down to go to sleep for at least 2 hours after eating. Prop head of bed up on blocks to keep head elevated. Avoid wearing tight fitting pants and belts. Avoid putting continuous pressure on the stomach such as prolonged bending and squatting. Contact healthcare provider if symptoms worsen or fail to improve. F/U: 6 months    Weight gain and fatigue  BMI 42.11  Cont low carbohydrate diet and work on exercise  Check labs  F/U: 6 months     Mixed hyperlipidemia  Check labs  Low fat diet  F/U: 6 months    H/O vitamin D deficiency  Cont vitamin D supplement  F/U: 6 months    Osteopenia  Cont Vit D and calcium supplements  F/U: 6 months    Rash to bilateral arms  Continue using sunscreen when out in the sun    Orders Placed This Encounter    LIPID PANEL     Standing Status:   Future     Number of Occurrences:   1     Standing Expiration Date:   6/14/2023    HEMOGLOBIN A1C WITH EAG     Standing Status:   Future     Number of Occurrences:   1     Standing Expiration Date:   6/14/2023    ALLERGEN PANEL, FOOD, BASIC     Standing Status:   Future     Number of Occurrences:   1     Standing Expiration Date:   6/14/2023       Follow-up and Dispositions    · Return in about 6 months (around 12/14/2022). Subjective: Works for [employer]. Here today with the following complaints:    GERD  Reports history of GERD. She is not on any prescription drugs at this time. States years ago she had an xray in the past that showed some fluid around her heart. She denies chest pain, but has a chronic cough related to the GERD. Of note, a chest xray was recently completed and she did not have fluid noted around her heart.    Weight gain and fatigue  She has been trying to lose weight, but she is having trouble. Eats eggs and chicken daily. Trying to decrease her carbohydrates, but states she eats a lot of potatoes to keep her bowels moving. Also eats grapes. She would like labs performed today. Will obtain CBC, CMP, TSH vitamin D and lipid panel. Mixed Hyperlipidemia  Working to loose weight but unfortunately she has been unable to gain weight. Weight is down 3# in the last three months. She plans to work on diet and exercise. Will check lipid panel. H/O Vitamin D Deficiency  She takes Vit D and calcium since she is allergic to milk. Will check Vitamin D level today. Osteopenia  Recent Dexa 3/1/22-> osteopenic change in the right femoral neck and left femoral neck. She does not have osteoporosis. Family history of osteopenia but not osteoporosis. Taking Vit D and calcium supplements. Rash to bilateral arms  Reports a rash to bilateral arms. The rash will come and go. Notices the rash if she is outside in the sun. Thinks it is from sun exposure. Patient Active Problem List   Diagnosis Code    Irritable bowel syndrome without diarrhea K58.9    Gastroesophageal reflux disease without esophagitis K21.9    Generalized abdominal pain R10.84    Left carpal tunnel syndrome G56.02    KEY (generalized anxiety disorder) F41.1    Class 1 obesity due to excess calories with body mass index (BMI) of 31.0 to 31.9 in adult E66.09, Z68.31    Mixed hyperlipidemia E78.2    DDD (degenerative disc disease), cervical M50.30       Past Medical History:   Diagnosis Date    Blood type O+     IBS (irritable bowel syndrome)     Menopause          Current Outpatient Medications:     inulin-vitamin D3 (Fiber Gummies with Vitamin D3) 2,500 mg- 500 unit chew, Take 1 Tablet by mouth daily. OTC, Disp: 30 Each, Rfl: 0    Bacillus coagulans (Digestive Advantage Prob Gummy) 250 million cell chew, Take 1 Tablet by mouth daily.  OTC, Disp: 30 Tablet, Rfl: 0   glucosam-chond-hyalu-CF borate (Move Free [facility]) 750 mg-100 mg- 1.65 mg-108 mg tab, 1 tab daily (OTC) exact dose unknown, Disp: 30 Tablet, Rfl: 0    Allergies   Allergen Reactions    Milk Diarrhea    Other Medication Unknown (comments)     Horse serum    Sulfa (Sulfonamide Antibiotics) Other (comments)     Patient states felt \"very sick and had high fever\"       Past Surgical History:   Procedure Laterality Date    COLONOSCOPY N/A 8/20/2021    COLONOSCOPY performed by Mohini Gonzalez MD at [address] [place] [address] History     Tobacco Use   Smoking Status Never Smoker   Smokeless Tobacco Never Used       Social History     Socioeconomic History    Marital status: SINGLE   Tobacco Use    Smoking status: Never Smoker    Smokeless tobacco: Never Used     Social Determinants of Health     Financial Resource Strain: Low Risk     Difficulty of Paying Living Expenses: Not hard at all   Food Insecurity: No Food Insecurity    Worried About Running Out of Food in the Last Year: Never true    Nena of Food in the Last Year: Never true       Family History   Problem Relation Age of Onset    Cancer Mother     Diabetes Mother     Cancer Father     Diabetes Father     Breast Cancer Sister         dx age 42's    Diabetes Sister     Diabetes Sister     Thyroid Disease Sister        ROS:  Review of Systems   Constitutional: Negative for chills, fever and weight loss. HENT: Negative for hearing loss and tinnitus. Eyes: Negative for blurred vision and double vision. Respiratory: Positive for cough (related to GERD). Cardiovascular: Negative for chest pain and palpitations. Gastrointestinal: Positive for heartburn (history of GERD). Negative for nausea and vomiting. Genitourinary: Negative for dysuria and urgency. Musculoskeletal: Positive for joint pain (taking glucosamine). Negative for myalgias. Skin: Negative for rash. Neurological: Negative for dizziness and headaches. Psychiatric/Behavioral: Negative for depression and suicidal ideas. Objective:     Visit Vitals  BP (!) 119/51 (BP 1 Location: Left arm)   Pulse 65   Temp 98 °F (36.7 °C) (Oral)   Resp 16   Ht 5' (1.524 m)   Wt 215 lb 9.6 oz (97.8 kg)   LMP  (LMP Unknown)   SpO2 97%   BMI 42.11 kg/m²     Body mass index is 42.11 kg/m². Physical Exam  Vitals reviewed. Constitutional:       Appearance: Normal appearance. She is obese. HENT:      Head: Normocephalic. Right Ear: External ear normal.      Left Ear: External ear normal.      Nose: Nose normal.      Mouth/Throat:      Mouth: Mucous membranes are moist.      Pharynx: Oropharynx is clear. Eyes:      Extraocular Movements: Extraocular movements intact. Conjunctiva/sclera: Conjunctivae normal.      Pupils: Pupils are equal, round, and reactive to light. Cardiovascular:      Rate and Rhythm: Normal rate and regular rhythm. Pulses: Normal pulses. Heart sounds: Normal heart sounds. Pulmonary:      Effort: Pulmonary effort is normal.      Breath sounds: Normal breath sounds. Abdominal:      General: Abdomen is flat. Musculoskeletal:         General: Normal range of motion. Skin:     General: Skin is warm. Neurological:      Mental Status: She is alert and oriented to person, place, and time. No results found for this visit on 06/14/22. Verbal and written instructions (see AVS) provided. Patient expresses understanding of diagnosis and treatment plan. Follow-up and Dispositions    · Return in about 6 months (around 12/14/2022). Patient has been advised to contact practice or seek care if condition persists or worsens.      Rita Castillo NP

## 2022-06-14 ENCOUNTER — OFFICE VISIT (OUTPATIENT)
Dept: FAMILY MEDICINE CLINIC | Age: 57
End: 2022-06-14
Payer: MEDICAID

## 2022-06-14 VITALS
DIASTOLIC BLOOD PRESSURE: 51 MMHG | TEMPERATURE: 98 F | HEIGHT: 60 IN | RESPIRATION RATE: 16 BRPM | HEART RATE: 65 BPM | WEIGHT: 215.6 LBS | SYSTOLIC BLOOD PRESSURE: 119 MMHG | OXYGEN SATURATION: 97 % | BODY MASS INDEX: 42.33 KG/M2

## 2022-06-14 DIAGNOSIS — R63.5 WEIGHT GAIN: ICD-10-CM

## 2022-06-14 DIAGNOSIS — R53.83 FATIGUE, UNSPECIFIED TYPE: ICD-10-CM

## 2022-06-14 DIAGNOSIS — Z86.39 H/O VITAMIN D DEFICIENCY: ICD-10-CM

## 2022-06-14 DIAGNOSIS — R92.8 ABNORMALITY OF LEFT BREAST ON SCREENING MAMMOGRAM: Primary | ICD-10-CM

## 2022-06-14 DIAGNOSIS — E78.2 MIXED HYPERLIPIDEMIA: Primary | ICD-10-CM

## 2022-06-14 DIAGNOSIS — R73.01 ELEVATED FASTING BLOOD SUGAR: ICD-10-CM

## 2022-06-14 DIAGNOSIS — R19.7 DIARRHEA, UNSPECIFIED TYPE: ICD-10-CM

## 2022-06-14 PROCEDURE — 99214 OFFICE O/P EST MOD 30 MIN: CPT | Performed by: NURSE PRACTITIONER

## 2022-06-14 NOTE — PROGRESS NOTES
Chief Complaint   Patient presents with    Rash    Vitamin D Deficiency     follow-up       1. \"Have you been to the ER, urgent care clinic since your last visit? Hospitalized since your last visit? \" No    2. \"Have you seen or consulted any other health care providers outside of the [address] [place] [address] since your last visit? \" No     3. For patients aged 39-70: Has the patient had a colonoscopy / FIT/ Cologuard? Yes - no Care Gap present      If the patient is female:    4. For patients aged 41-77: Has the patient had a mammogram within the past 2 years? Yes - no Care Gap present      5. For patients aged 21-65: Has the patient had a pap smear? Yes - no Care Gap present      Identified pt with two pt identifiers(name and ). Reviewed record in preparation for visit and have obtained necessary documentation.     Symptom review:    NO  Fever   NO  Shaking chills  NO  Cough  NO Headaches  NO  Body aches  NO  Coughing up blood  NO  Chest congestion  NO  Chest pain  NO  Shortness of breath  NO  Profound Loss of smell/taste  NO  Nausea/Vomiting   NO  Loose stool/Diarrhea  NO  any skin issues

## 2022-06-15 LAB
25(OH)D3 SERPL-MCNC: 27.3 NG/ML (ref 30–100)
CHOLEST SERPL-MCNC: 243 MG/DL
EST. AVERAGE GLUCOSE BLD GHB EST-MCNC: 120 MG/DL
HBA1C MFR BLD: 5.8 % (ref 4–5.6)
HDLC SERPL-MCNC: 37 MG/DL
HDLC SERPL: 6.6 {RATIO} (ref 0–5)
LDLC SERPL CALC-MCNC: 159.2 MG/DL (ref 0–100)
TRIGL SERPL-MCNC: 234 MG/DL (ref ?–150)
TSH SERPL DL<=0.05 MIU/L-ACNC: 1.61 UIU/ML (ref 0.36–3.74)
VLDLC SERPL CALC-MCNC: 46.8 MG/DL

## 2022-06-21 ENCOUNTER — HOSPITAL ENCOUNTER (OUTPATIENT)
Dept: MAMMOGRAPHY | Age: 57
Discharge: HOME | End: 2022-06-21
Attending: FAMILY MEDICINE
Payer: MEDICAID

## 2022-06-21 ENCOUNTER — HOSPITAL ENCOUNTER (OUTPATIENT)
Dept: ULTRASOUND IMAGING | Age: 57
Discharge: HOME | End: 2022-06-21
Attending: FAMILY MEDICINE
Payer: MEDICAID

## 2022-06-21 DIAGNOSIS — R92.8 ABNORMAL MAMMOGRAM: ICD-10-CM

## 2022-06-21 DIAGNOSIS — R92.8 ABNORMALITY OF LEFT BREAST ON SCREENING MAMMOGRAM: ICD-10-CM

## 2022-06-21 LAB
CLASS DESCRIPTION, 600268: ABNORMAL
CODFISH IGE QN: <0.1 KU/L
COW MILK IGE QN: 0.43 KU/L
EGG WHITE IGE QN: <0.1 KU/L
PEANUT IGE QN: <0.1 KU/L
SOYBEAN IGE QN: <0.1 KU/L
WHEAT IGE QN: <0.1 KU/L

## 2022-06-21 PROCEDURE — 77065 DX MAMMO INCL CAD UNI: CPT

## 2022-09-05 NOTE — PROGRESS NOTES
Haley Grady is a 62 y.o. female who presents today with c/o   Chief Complaint   Patient presents with    Vitamin D Deficiency           Assessment/ Plan:     GERD  Trial omeprazole for 3 months  Referral placed to Dr. Sabino Monzon for possible endoscopy if recommended  Avoid spicy and citreus foods, caffeine, and alcohol. Eat smaller, more frequent meals. Do not lay down to go to sleep for at least 2 hours after eating. Prop head of bed up on blocks to keep head elevated. Avoid wearing tight fitting pants and belts. Avoid putting continuous pressure on the stomach such as prolonged bending and squatting. Contact healthcare provider if symptoms worsen or fail to improve. F/U: 6 months    Weight gain and fatigue  BMI 42.06  Cont low carbohydrate diet and work on exercise  F/U: 6 months     Mixed hyperlipidemia  Check labs  Low fat diet  F/U: 6 months    H/O vitamin D deficiency  Cont vitamin D supplement--will start prescription dose if vitamin d is still low  F/U: 6 months    Osteopenia  Cont Vit D and calcium supplements  F/U: 6 months      Orders Placed This Encounter    COLLECTION VENOUS BLOOD,VENIPUNCTURE    LIPID PANEL     Standing Status:   Future     Number of Occurrences:   1     Standing Expiration Date:   9/7/2023    VITAMIN D, 25 HYDROXY     Standing Status:   Future     Number of Occurrences:   1     Standing Expiration Date:   9/7/2023    Breanne Piano [facility] EMPL     Referral Priority:   Routine     Referral Type:   Consultation     Referral Reason:   Specialty Services Required     Referred to Provider:   Alondra Hester MD     Number of Visits Requested:   1    omeprazole (PRILOSEC) 20 mg capsule     Sig: Take 1 Capsule by mouth daily. Dispense:  90 Capsule     Refill:  0       Follow-up and Dispositions    Return in about 6 months (around 3/7/2023). Subjective: Works for [employer]. Here today with the following complaints:    GERD  Reports history of GERD.  She feels choked when she is eating. Feels a burning in her upper chest. States she is trying to work on diet. She is avoiding soda. Fast food does make her symptoms worse. She is not on any prescription drugs at this time. She was prescribed omeprazole last year after her colonoscopy with Dr. Sabino Monzon. She states the omeprazole was very helpful but with her history of osteopenia she does not want to take omeprazole d/t the risk of decreased calcium absorption. She is interested in an endoscopy to see if anything is wrong since she is having significant discomfort. Will refer her for recommendations. Weight gain and fatigue  She has been trying to lose weight, but she is having trouble. Eats eggs and chicken daily. Trying to decrease her carbohydrates, but states she eats a lot of potatoes to keep her bowels moving. Also eats grapes. Mixed Hyperlipidemia  Working to loose weight but unfortunately she has been unable to loose weight. Weight is 215# today. Lab Results   Component Value Date/Time    Cholesterol, total 243 (H) 06/14/2022 08:10 AM    HDL Cholesterol 37 06/14/2022 08:10 AM    LDL, calculated 159.2 (H) 06/14/2022 08:10 AM    VLDL, calculated 46.8 06/14/2022 08:10 AM    Triglyceride 234 (H) 06/14/2022 08:10 AM    CHOL/HDL Ratio 6.6 (H) 06/14/2022 08:10 AM       H/O Vitamin D Deficiency  She takes Vit D and calcium since she is allergic to milk. Will check Vitamin D level today. Lab Results   Component Value Date/Time    Vitamin D 25-Hydroxy 27.3 (L) 06/14/2022 08:10 AM       Osteopenia  Recent Dexa 3/1/22-> osteopenic change in the right femoral neck and left femoral neck. She does not have osteoporosis. Family history of osteopenia but not osteoporosis. Taking Vit D and calcium supplements.   DEXA Results (most recent):  Results from Hospital Encounter encounter on 03/01/22    DEXA BONE DENSITY STUDY AXIAL    Narrative  Bone Mineral Density Study dated 3/1/2022    INDICATION: Menopause    FINDINGS:  Imaging was performed on the GE lunar Prodigy machine. Lumbar spine: The bone mineral density from L1 L4 is 1.181 g/sq cm. This  represents a T score of -0.1. This is within the normal range. Right femoral neck: The bone mineral density is 0.799 g/sq cm. This represents a  T score of -1.7. This is compatible with osteopenic change. Left femoral neck: The bone mineral density is 0.823 g/sq cm. This represents a  T score of -1.5. This is compatible with osteopenic change. Impression  1. Normal bone mineral density of the lumbar spine limited evidence of  osteopenic change involving the femoral necks. FRAX Report: (10 year probability of fracture). Major osteoporotic fracture percentage: 12.6%  Hip fracture percentage: 1.2%      Patient Active Problem List   Diagnosis Code    Irritable bowel syndrome without diarrhea K58.9    Gastroesophageal reflux disease without esophagitis K21.9    Generalized abdominal pain R10.84    Left carpal tunnel syndrome G56.02    KEY (generalized anxiety disorder) F41.1    Class 1 obesity due to excess calories with body mass index (BMI) of 31.0 to 31.9 in adult E66.09, Z68.31    Mixed hyperlipidemia E78.2    DDD (degenerative disc disease), cervical M50.30       Past Medical History:   Diagnosis Date    Blood type O+     IBS (irritable bowel syndrome)     Menopause          Current Outpatient Medications:     omeprazole (PRILOSEC) 20 mg capsule, Take 1 Capsule by mouth daily. , Disp: 90 Capsule, Rfl: 0    inulin-vitamin D3 (Fiber Gummies with Vitamin D3) 2,500 mg- 500 unit chew, Take 1 Tablet by mouth daily. OTC, Disp: 30 Each, Rfl: 0    Bacillus coagulans (Digestive Advantage Prob Gummy) 250 million cell chew, Take 1 Tablet by mouth daily.  OTC, Disp: 30 Tablet, Rfl: 0    glucosam-chond-hyalu-CF borate (Move Free [facility]) 750 mg-100 mg- 1.65 mg-108 mg tab, 1 tab daily (OTC) exact dose unknown, Disp: 30 Tablet, Rfl: 0    Allergies   Allergen Reactions    Milk Diarrhea    Other Medication Unknown (comments)     Horse serum    Sulfa (Sulfonamide Antibiotics) Other (comments)     Patient states felt \"very sick and had high fever\"       Past Surgical History:   Procedure Laterality Date    COLONOSCOPY N/A 8/20/2021    COLONOSCOPY performed by Lamont Pagan MD at [address] MAIN OR       Social History     Tobacco Use   Smoking Status Never   Smokeless Tobacco Never       Social History     Socioeconomic History    Marital status: SINGLE   Tobacco Use    Smoking status: Never    Smokeless tobacco: Never     Social Determinants of Health     Financial Resource Strain: Low Risk     Difficulty of Paying Living Expenses: Not hard at all   Food Insecurity: No Food Insecurity    Worried About Running Out of Food in the Last Year: Never true    Ran Out of Food in the Last Year: Never true       Family History   Problem Relation Age of Onset    Cancer Mother     Diabetes Mother     Cancer Father     Diabetes Father     Breast Cancer Sister         dx age 42's    Diabetes Sister     Diabetes Sister     Thyroid Disease Sister     Thyroid Disease Sister        ROS:  Review of Systems   Constitutional:  Negative for chills, fever and weight loss. HENT:  Negative for hearing loss and tinnitus. Eyes:  Negative for blurred vision and double vision. Respiratory:  Positive for cough (related to GERD). Cardiovascular:  Negative for chest pain and palpitations. Gastrointestinal:  Positive for heartburn (history of GERD). Negative for nausea and vomiting. Genitourinary:  Negative for dysuria and urgency. Musculoskeletal:  Positive for joint pain (taking glucosamine). Negative for myalgias. Skin:  Negative for rash. Neurological:  Negative for dizziness and headaches. Psychiatric/Behavioral:  Negative for depression and suicidal ideas.         Objective:     Visit Vitals  /71 (BP 1 Location: Left upper arm)   Pulse 62   Temp 97.8 °F (36.6 °C) (Temporal)   Resp 18   Ht 5' (1.524 m)   Wt 215 lb 6 oz (97.7 kg)   LMP  (LMP Unknown)   BMI 42.06 kg/m²     Body mass index is 42.06 kg/m². Physical Exam  Vitals reviewed. Constitutional:       Appearance: Normal appearance. She is obese. HENT:      Head: Normocephalic. Right Ear: External ear normal.      Left Ear: External ear normal.      Nose: Nose normal.      Mouth/Throat:      Mouth: Mucous membranes are moist.      Pharynx: Oropharynx is clear. Eyes:      Extraocular Movements: Extraocular movements intact. Conjunctiva/sclera: Conjunctivae normal.      Pupils: Pupils are equal, round, and reactive to light. Cardiovascular:      Rate and Rhythm: Normal rate and regular rhythm. Pulses: Normal pulses. Heart sounds: Normal heart sounds. Pulmonary:      Effort: Pulmonary effort is normal.      Breath sounds: Normal breath sounds. Abdominal:      General: Abdomen is flat. Musculoskeletal:         General: Normal range of motion. Skin:     General: Skin is warm. Neurological:      Mental Status: She is alert and oriented to person, place, and time. No results found for this visit on 09/07/22. Verbal and written instructions (see AVS) provided. Patient expresses understanding of diagnosis and treatment plan. Follow-up and Dispositions    Return in about 6 months (around 3/7/2023). Patient has been advised to contact practice or seek care if condition persists or worsens.      Valencia Chairez NP

## 2022-09-07 ENCOUNTER — OFFICE VISIT (OUTPATIENT)
Dept: FAMILY MEDICINE CLINIC | Age: 57
End: 2022-09-07
Payer: MEDICAID

## 2022-09-07 VITALS
HEART RATE: 62 BPM | DIASTOLIC BLOOD PRESSURE: 71 MMHG | RESPIRATION RATE: 18 BRPM | HEIGHT: 60 IN | TEMPERATURE: 97.8 F | SYSTOLIC BLOOD PRESSURE: 110 MMHG | BODY MASS INDEX: 42.28 KG/M2 | WEIGHT: 215.38 LBS

## 2022-09-07 DIAGNOSIS — Z86.39 H/O VITAMIN D DEFICIENCY: ICD-10-CM

## 2022-09-07 DIAGNOSIS — E78.2 MIXED HYPERLIPIDEMIA: Primary | ICD-10-CM

## 2022-09-07 DIAGNOSIS — K21.9 GASTROESOPHAGEAL REFLUX DISEASE WITHOUT ESOPHAGITIS: ICD-10-CM

## 2022-09-07 DIAGNOSIS — R63.5 WEIGHT GAIN: ICD-10-CM

## 2022-09-07 DIAGNOSIS — M85.80 OSTEOPENIA, UNSPECIFIED LOCATION: ICD-10-CM

## 2022-09-07 DIAGNOSIS — R53.83 FATIGUE, UNSPECIFIED TYPE: ICD-10-CM

## 2022-09-07 PROCEDURE — 36415 COLL VENOUS BLD VENIPUNCTURE: CPT | Performed by: NURSE PRACTITIONER

## 2022-09-07 PROCEDURE — 99214 OFFICE O/P EST MOD 30 MIN: CPT | Performed by: NURSE PRACTITIONER

## 2022-09-07 RX ORDER — OMEPRAZOLE 20 MG/1
20 CAPSULE, DELAYED RELEASE ORAL DAILY
Qty: 90 CAPSULE | Refills: 0 | Status: SHIPPED | OUTPATIENT
Start: 2022-09-07

## 2022-09-07 NOTE — PROGRESS NOTES
Labs drawn in left arm per Monik's orders. Pt tolerated well. 1. \"Have you been to the ER, urgent care clinic since your last visit? Hospitalized since your last visit? \" No    2. \"Have you seen or consulted any other health care providers outside of the [address] [place] [address] since your last visit? \" No     3. For patients aged 39-70: Has the patient had a colonoscopy / FIT/ Cologuard? Yes - no Care Gap present      If the patient is female:    4. For patients aged 41-77: Has the patient had a mammogram within the past 2 years? Yes - no Care Gap present      5. For patients aged 21-65: Has the patient had a pap smear?  Yes - no Care Gap present

## 2022-09-08 LAB
25(OH)D3 SERPL-MCNC: 27.6 NG/ML (ref 30–100)
CHOLEST SERPL-MCNC: 222 MG/DL
HDLC SERPL-MCNC: 37 MG/DL
HDLC SERPL: 6 {RATIO} (ref 0–5)
LDLC SERPL CALC-MCNC: 144.4 MG/DL (ref 0–100)
TRIGL SERPL-MCNC: 203 MG/DL (ref ?–150)
VLDLC SERPL CALC-MCNC: 40.6 MG/DL

## 2022-09-08 RX ORDER — ERGOCALCIFEROL 1.25 MG/1
50000 CAPSULE ORAL
Qty: 12 CAPSULE | Refills: 0 | Status: SHIPPED | OUTPATIENT
Start: 2022-09-08

## 2022-09-08 NOTE — PROGRESS NOTES
Vitamin D is still low. I have sent in a prescription strength vitamin D for her to take once weekly and we can recheck on her next visit. She can take plain fiber gummies while taking this amount of vitamin D. Cholesterol is still a little high, but looks much better than two months ago. Continue working on diet and exercise.

## 2022-10-28 NOTE — PROGRESS NOTES
Consent:  She and/or her healthcare decision maker is aware that this patient-initiated Telehealth encounter is a billable service, with coverage as determined by her insurance carrier. She is aware that she may receive a bill and has provided verbal consent to proceed: Yes    I was in the office while conducting this encounter. Sarah Carrasquillo is a 62 y.o. female who was seen by synchronous (real-time) audio-video technology on 11/2/2022. Pt was seen at home. No other participants in this encounter. Subjective: Form Completion (Needs DME filled out), Incontinence, and Vitamin D Deficiency  Sarah Carrasquillo is a 62 y.o. female here for form completion. She has a history of IBS which she states has impaired her mobility if she does not have the disposable liners to wear during the day. She never knows when she may have incontinence of her fecal material related to her IBS. She will sometimes have urinary incontinence with these episodes. She would like us to complete paperwork to receive the medical supplies through her insurance. Vitamin D deficiency  H/O vitamin D deficiency. She is about to run out of the vitamin D supplement. She will RTO as a nurse visit and have her level checked. Covid-19  She was recently exposed to covid-19 about 5 days ago by a co-worker. She tested herself last night and she was positive. She reports subjective fevers as she does not have a thermometer at home. She feels like she has allergies. Runny nose with mild congestion. She is managing her symptoms with OTC APAP and ibuprofen. I do not recommend antiviral treatment since she is post 5 days symptom onset, <65 and not high risk. She agrees with plan of care and will make another appointment if her symptoms worsen. PMH, SH, Medications/Allergies: reviewed, on chart.   Current Outpatient Medications   Medication Sig    ergocalciferol (ERGOCALCIFEROL) 1,250 mcg (50,000 unit) capsule Take 1 Capsule by mouth every seven (7) days. omeprazole (PRILOSEC) 20 mg capsule Take 1 Capsule by mouth daily. inulin-vitamin D3 (Fiber Gummies with Vitamin D3) 2,500 mg- 500 unit chew Take 1 Tablet by mouth daily. OTC    Bacillus coagulans (Digestive Advantage Prob Gummy) 250 million cell chew Take 1 Tablet by mouth daily. OTC    glucosam-chond-hyalu-CF borate (Move Free [facility]) 750 mg-100 mg- 1.65 mg-108 mg tab 1 tab daily (OTC) exact dose unknown     No current facility-administered medications for this visit. Allergies   Allergen Reactions    Milk Diarrhea    Other Medication Unknown (comments)     Horse serum    Sulfa (Sulfonamide Antibiotics) Other (comments)     Patient states felt \"very sick and had high fever\"       ROS:  Constitutional: No fever, chills or abnormal weight loss  Respiratory: No cough, SOB   CV: No chest pain or Palpitations    VS review:   Wt Readings from Last 3 Encounters:   09/07/22 215 lb 6 oz (97.7 kg)   06/14/22 215 lb 9.6 oz (97.8 kg)   03/15/22 218 lb 2 oz (98.9 kg)     BP Readings from Last 3 Encounters:   09/07/22 110/71   06/14/22 (!) 119/51   03/15/22 (!) 128/56       Objective:     General: alert, cooperative, no distress   Mental  status: mental status: alert, oriented to person, place, and time, normal mood, behavior, speech, dress, motor activity, and thought processes                   Assessment & Plan:   DME form completion  Form completed and given to the  staff to be faxed    Vitamin D deficiency  Check vitamin D as a nurse visit  Will rx prescription once resulted    Covid-19  Home test positive  Continue OTC ibuprofen and APAP  Drink plenty of fluids and rest      Time-based coding, delete if not needed: I spent at least 15 minutes with this established patient, and >50% of the time was spent counseling and/or coordinating care regarding see above  Aryan MIRTA Hardy      Due to this being a TeleHealth evaluation, many elements of the physical examination are unable to be assessed. We discussed the expected course, resolution and complications of the diagnosis(es) in detail. Medication risks, benefits, costs, interactions, and alternatives were discussed as indicated. I advised her to contact the office if her condition worsens, changes or fails to improve as anticipated. She expressed understanding with the diagnosis(es) and plan. Pursuant to the emergency declaration under the [address] [place] [address] waiver authority and the [employer] Act, this Virtual  Visit was conducted, with patient's consent, to reduce the patient's risk of exposure to COVID-19 and provide continuity of care for an established patient. Services were provided through a video synchronous discussion virtually to substitute for in-person clinic visit.     CPT Codes 44334-15642 for Established Patients may apply to this Telehealth Visit

## 2022-11-02 ENCOUNTER — VIRTUAL VISIT (OUTPATIENT)
Dept: FAMILY MEDICINE CLINIC | Age: 57
End: 2022-11-02
Payer: MEDICAID

## 2022-11-02 DIAGNOSIS — Z02.89 ENCOUNTER FOR COMPLETION OF FORM WITH PATIENT: Primary | ICD-10-CM

## 2022-11-02 DIAGNOSIS — Z86.39 H/O VITAMIN D DEFICIENCY: ICD-10-CM

## 2022-11-02 DIAGNOSIS — U07.1 COVID-19: ICD-10-CM

## 2022-11-02 PROCEDURE — 99213 OFFICE O/P EST LOW 20 MIN: CPT | Performed by: NURSE PRACTITIONER

## 2022-11-02 NOTE — PROGRESS NOTES
1. \"Have you been to the ER, urgent care clinic since your last visit? Hospitalized since your last visit? \" No    2. \"Have you seen or consulted any other health care providers outside of the [address] [place] [address] since your last visit? \" No     3. For patients aged 39-70: Has the patient had a colonoscopy / FIT/ Cologuard? Yes - no Care Gap present      If the patient is female:    4. For patients aged 41-77: Has the patient had a mammogram within the past 2 years? Yes - no Care Gap present      5. For patients aged 21-65: Has the patient had a pap smear?  Yes - no Care Gap present

## 2022-11-11 ENCOUNTER — LAB ONLY (OUTPATIENT)
Dept: FAMILY MEDICINE CLINIC | Age: 57
End: 2022-11-11
Payer: MEDICAID

## 2022-11-11 DIAGNOSIS — Z86.39 H/O VITAMIN D DEFICIENCY: ICD-10-CM

## 2022-11-11 PROCEDURE — 36415 COLL VENOUS BLD VENIPUNCTURE: CPT | Performed by: NURSE PRACTITIONER

## 2022-11-12 ENCOUNTER — PATIENT MESSAGE (OUTPATIENT)
Dept: FAMILY MEDICINE CLINIC | Age: 57
End: 2022-11-12

## 2022-11-12 LAB — 25(OH)D3 SERPL-MCNC: 61.9 NG/ML (ref 30–100)

## 2022-11-12 NOTE — PROGRESS NOTES
Vitamin D looks great. I would continue daily supplement--we do not need prescription dose this time.   Everest message sent

## 2023-01-16 ENCOUNTER — OFFICE VISIT (OUTPATIENT)
Dept: SURGERY | Age: 58
End: 2023-01-16
Payer: MEDICAID

## 2023-01-16 VITALS
BODY MASS INDEX: 37.73 KG/M2 | SYSTOLIC BLOOD PRESSURE: 139 MMHG | TEMPERATURE: 97.5 F | OXYGEN SATURATION: 94 % | HEIGHT: 64 IN | HEART RATE: 74 BPM | DIASTOLIC BLOOD PRESSURE: 74 MMHG | RESPIRATION RATE: 20 BRPM | WEIGHT: 221 LBS

## 2023-01-16 DIAGNOSIS — K21.9 GASTROESOPHAGEAL REFLUX DISEASE WITHOUT ESOPHAGITIS: Primary | ICD-10-CM

## 2023-01-16 PROCEDURE — 99214 OFFICE O/P EST MOD 30 MIN: CPT | Performed by: SURGERY

## 2023-01-16 RX ORDER — PANTOPRAZOLE SODIUM 40 MG/1
40 TABLET, DELAYED RELEASE ORAL DAILY
Qty: 30 TABLET | Refills: 3 | Status: SHIPPED | OUTPATIENT
Start: 2023-01-16

## 2023-01-16 NOTE — LETTER
1/16/2023    Patient: Giovanni Ocasio   YOB: 1965   Date of Visit: 1/16/2023     Savana Quarles NP  [address] [facility]  Via In Basket    Dear Savana Quarles NP,      Thank you for referring Ms. Jasmina Finn to [facility] for evaluation. My notes for this consultation are attached. If you have questions, please do not hesitate to call me. I look forward to following your patient along with you.       Sincerely,    Siobhan Hernandez MD

## 2023-01-16 NOTE — PROGRESS NOTES
Bernadine Rodrigez is a 62 y.o. female who presents today with the following:  Chief Complaint   Patient presents with    GERD       HPI    59-year-old female who presents as a referral from Milagros Love for evaluation of GERD. I actually saw her in 2021 for a colonoscopy. She states that she has been having problems with reflux for the last 20 to 25 years. She actually tried a course of omeprazole over-the-counter and had some improvement but no resolution of her symptoms and discontinued this. She does have some concern about using a PPI because of calcium and vitamin D deficiency. In terms of her reflux she states that her symptoms have actually worsened over the last year. These episodes resolve in a \"gassy\" feeling up into her upper chest and dyspepsia as well as coughing secondary to the reflux. She denies any problems swallowing. She may have 5 or 6 episodes a day that can be triggered by eating or drinking anything including water or salads. She tries taking Tums occasionally but she states that this worsens her problems with constipation so she does not like to do this. She has not taken a PPI for approximately the last month. She does admit to a 15 pound weight gain over the last year. She does not smoke and rarely drinks alcohol. She has had no prior surgeries.   Past Medical History:   Diagnosis Date    Blood type O+     IBS (irritable bowel syndrome)     Menopause        Past Surgical History:   Procedure Laterality Date    COLONOSCOPY N/A 8/20/2021    COLONOSCOPY performed by Rima Kramer MD at [address] [place] [address] History     Socioeconomic History    Marital status: SINGLE     Spouse name: Not on file    Number of children: Not on file    Years of education: Not on file    Highest education level: Not on file   Occupational History    Not on file   Tobacco Use    Smoking status: Never    Smokeless tobacco: Never   Substance and Sexual Activity    Alcohol use: Not on file    Drug use: Not on file    Sexual activity: Not on file   Other Topics Concern    Not on file   Social History Narrative    Not on file     Social Determinants of Health     Financial Resource Strain: Low Risk     Difficulty of Paying Living Expenses: Not hard at all   Food Insecurity: No Food Insecurity    Worried About Running Out of Food in the Last Year: Never true    Ran Out of Food in the Last Year: Never true   Transportation Needs: Not on file   Physical Activity: Not on file   Stress: Not on file   Social Connections: Not on file   Intimate Partner Violence: Not on file   Housing Stability: Not on file       Family History   Problem Relation Age of Onset    Cancer Mother     Diabetes Mother     Cancer Father     Diabetes Father     Breast Cancer Sister         dx age 42's    Diabetes Sister     Diabetes Sister     Thyroid Disease Sister     Thyroid Disease Sister        Allergies   Allergen Reactions    Milk Diarrhea    Other Medication Unknown (comments)     Horse serum    Sulfa (Sulfonamide Antibiotics) Other (comments)     Patient states felt \"very sick and had high fever\"       Current Outpatient Medications   Medication Sig    pantoprazole (PROTONIX) 40 mg tablet Take 1 Tablet by mouth daily. Indications: gastroesophageal reflux disease    inulin-vitamin D3 (Fiber Gummies with Vitamin D3) 2,500 mg- 500 unit chew Take 1 Tablet by mouth daily. OTC    Bacillus coagulans (Digestive Advantage Prob Gummy) 250 million cell chew Take 1 Tablet by mouth daily. OTC    glucosam-chond-hyalu-CF borate (Move Free [facility]) 750 mg-100 mg- 1.65 mg-108 mg tab 1 tab daily (OTC) exact dose unknown    ergocalciferol (ERGOCALCIFEROL) 1,250 mcg (50,000 unit) capsule Take 1 Capsule by mouth every seven (7) days. No current facility-administered medications for this visit. The above histories, medications and allergies have been reviewed.     ROS    Visit Vitals  /74 (BP 1 Location: Left upper arm, BP Patient Position: Sitting, BP Cuff Size: Adult)   Pulse 74   Temp 97.5 °F (36.4 °C) (Temporal)   Resp 20   Ht 5' 4\" (1.626 m)   Wt 221 lb (100.2 kg)   LMP  (LMP Unknown)   SpO2 94%   BMI 37.93 kg/m²     Physical Exam  Constitutional:       Appearance: She is obese. Cardiovascular:      Rate and Rhythm: Normal rate and regular rhythm. Pulmonary:      Effort: No respiratory distress. Breath sounds: Normal breath sounds. No wheezing. Abdominal:      General: There is no distension. Palpations: Abdomen is soft. There is no mass. Tenderness: There is no abdominal tenderness. Neurological:      Mental Status: She is alert. 1. Gastroesophageal reflux disease without esophagitis  She has had no prior evaluation. We we will obtain an upper GI. We will also start her on Protonix daily to see if this helps improve her symptoms. Depending on what the upper GI shows she may require EGD. We initiated discussion about treatments for reflux including medications and consideration of surgical intervention. We will discuss this further once we see how she responds to the PPI as well as seeing what the upper GI shows.  - pantoprazole (PROTONIX) 40 mg tablet; Take 1 Tablet by mouth daily. Indications: gastroesophageal reflux disease  Dispense: 30 Tablet; Refill: 3  - XR UPPER GI SERIES W KUB; Future      Follow-up and Dispositions    Return in about 6 weeks (around 2/27/2023) for after testing.          Jaswant Unger MD

## 2023-01-16 NOTE — PROGRESS NOTES
Identified pt with two pt identifiers (name and ). Reviewed chart in preparation for visit and have obtained necessary documentation. Kavon Payan is a 62 y.o. female  Chief Complaint   Patient presents with    GERD     Visit Vitals  LMP  (LMP Unknown)       1. Have you been to the ER, urgent care clinic since your last visit? Hospitalized since your last visit? No    2. Have you seen or consulted any other health care providers outside of the [address] [place] [address] since your last visit? Include any pap smears or colon screening.  No

## 2023-01-31 ENCOUNTER — TELEPHONE (OUTPATIENT)
Dept: SURGERY | Age: 58
End: 2023-01-31

## 2023-01-31 NOTE — TELEPHONE ENCOUNTER
Pt called that her upper GI series has been rescheduled. We will speak with Dr. Rodriguez July as to if he wants her to come back for follow up 3 or 6 weeks after. Giving message to [facility] [employer] [facility] and we will call her back tomorrow.

## 2023-02-17 DIAGNOSIS — K21.9 GASTROESOPHAGEAL REFLUX DISEASE WITHOUT ESOPHAGITIS: ICD-10-CM

## 2023-02-20 RX ORDER — PANTOPRAZOLE SODIUM 40 MG/1
40 TABLET, DELAYED RELEASE ORAL DAILY
Qty: 30 TABLET | Refills: 3 | OUTPATIENT
Start: 2023-02-20

## 2023-02-20 NOTE — TELEPHONE ENCOUNTER
Spoke to patient. Patient has three refills. Patient is aware of her refills. Patient told me she has taken care of this.

## 2023-04-21 DIAGNOSIS — R92.8 ABNORMAL MAMMOGRAM: Primary | ICD-10-CM

## 2023-06-21 ENCOUNTER — TRANSCRIBE ORDERS (OUTPATIENT)
Facility: HOSPITAL | Age: 58
End: 2023-06-21

## 2023-06-21 DIAGNOSIS — Z12.31 SCREENING MAMMOGRAM FOR BREAST CANCER: Primary | ICD-10-CM

## 2023-06-23 ENCOUNTER — HOSPITAL ENCOUNTER (OUTPATIENT)
Facility: HOSPITAL | Age: 58
End: 2023-06-23
Payer: MEDICAID

## 2023-06-23 DIAGNOSIS — Z12.31 SCREENING MAMMOGRAM FOR BREAST CANCER: ICD-10-CM

## 2023-06-23 PROCEDURE — 77063 BREAST TOMOSYNTHESIS BI: CPT

## 2023-08-26 SDOH — ECONOMIC STABILITY: FOOD INSECURITY: WITHIN THE PAST 12 MONTHS, YOU WORRIED THAT YOUR FOOD WOULD RUN OUT BEFORE YOU GOT MONEY TO BUY MORE.: NEVER TRUE

## 2023-08-26 SDOH — ECONOMIC STABILITY: INCOME INSECURITY: HOW HARD IS IT FOR YOU TO PAY FOR THE VERY BASICS LIKE FOOD, HOUSING, MEDICAL CARE, AND HEATING?: SOMEWHAT HARD

## 2023-08-26 SDOH — ECONOMIC STABILITY: FOOD INSECURITY: WITHIN THE PAST 12 MONTHS, THE FOOD YOU BOUGHT JUST DIDN'T LAST AND YOU DIDN'T HAVE MONEY TO GET MORE.: NEVER TRUE

## 2023-08-26 SDOH — ECONOMIC STABILITY: HOUSING INSECURITY
IN THE LAST 12 MONTHS, WAS THERE A TIME WHEN YOU DID NOT HAVE A STEADY PLACE TO SLEEP OR SLEPT IN A SHELTER (INCLUDING NOW)?: NO

## 2023-08-26 SDOH — ECONOMIC STABILITY: TRANSPORTATION INSECURITY
IN THE PAST 12 MONTHS, HAS LACK OF TRANSPORTATION KEPT YOU FROM MEETINGS, WORK, OR FROM GETTING THINGS NEEDED FOR DAILY LIVING?: NO

## 2023-08-29 ENCOUNTER — OFFICE VISIT (OUTPATIENT)
Age: 58
End: 2023-08-29
Payer: MEDICAID

## 2023-08-29 VITALS
SYSTOLIC BLOOD PRESSURE: 132 MMHG | OXYGEN SATURATION: 97 % | HEART RATE: 70 BPM | HEIGHT: 64 IN | BODY MASS INDEX: 38.41 KG/M2 | WEIGHT: 225 LBS | DIASTOLIC BLOOD PRESSURE: 83 MMHG

## 2023-08-29 DIAGNOSIS — J01.00 ACUTE NON-RECURRENT MAXILLARY SINUSITIS: Primary | ICD-10-CM

## 2023-08-29 DIAGNOSIS — H60.92 OTITIS EXTERNA OF LEFT EAR, UNSPECIFIED CHRONICITY, UNSPECIFIED TYPE: ICD-10-CM

## 2023-08-29 PROCEDURE — 99213 OFFICE O/P EST LOW 20 MIN: CPT | Performed by: NURSE PRACTITIONER

## 2023-08-29 RX ORDER — AMOXICILLIN AND CLAVULANATE POTASSIUM 875; 125 MG/1; MG/1
1 TABLET, FILM COATED ORAL 2 TIMES DAILY
Qty: 20 TABLET | Refills: 0 | Status: SHIPPED | OUTPATIENT
Start: 2023-08-29 | End: 2023-09-08

## 2023-08-29 RX ORDER — PANTOPRAZOLE SODIUM 40 MG/1
40 TABLET, DELAYED RELEASE ORAL DAILY
Qty: 90 TABLET | Refills: 1 | Status: SHIPPED | OUTPATIENT
Start: 2023-08-29

## 2023-08-29 RX ORDER — PANTOPRAZOLE SODIUM 40 MG/1
40 TABLET, DELAYED RELEASE ORAL DAILY
Qty: 30 TABLET | Status: CANCELLED | OUTPATIENT
Start: 2023-08-29

## 2023-08-29 RX ORDER — NEOMYCIN SULFATE, POLYMYXIN B SULFATE AND HYDROCORTISONE 10; 3.5; 1 MG/ML; MG/ML; [USP'U]/ML
3 SUSPENSION/ DROPS AURICULAR (OTIC) 4 TIMES DAILY
Qty: 10 ML | Refills: 0 | Status: SHIPPED | OUTPATIENT
Start: 2023-08-29 | End: 2023-09-08

## 2023-08-29 RX ORDER — MONTELUKAST SODIUM 10 MG/1
10 TABLET ORAL DAILY
Qty: 30 TABLET | Refills: 3 | Status: SHIPPED | OUTPATIENT
Start: 2023-08-29

## 2023-08-29 SDOH — ECONOMIC STABILITY: FOOD INSECURITY: WITHIN THE PAST 12 MONTHS, THE FOOD YOU BOUGHT JUST DIDN'T LAST AND YOU DIDN'T HAVE MONEY TO GET MORE.: NEVER TRUE

## 2023-08-29 SDOH — ECONOMIC STABILITY: INCOME INSECURITY: HOW HARD IS IT FOR YOU TO PAY FOR THE VERY BASICS LIKE FOOD, HOUSING, MEDICAL CARE, AND HEATING?: NOT VERY HARD

## 2023-08-29 SDOH — ECONOMIC STABILITY: FOOD INSECURITY: WITHIN THE PAST 12 MONTHS, YOU WORRIED THAT YOUR FOOD WOULD RUN OUT BEFORE YOU GOT MONEY TO BUY MORE.: NEVER TRUE

## 2023-08-29 ASSESSMENT — ENCOUNTER SYMPTOMS
EYE DISCHARGE: 0
ABDOMINAL DISTENTION: 0
SINUS PAIN: 1
SINUS PRESSURE: 1
COLOR CHANGE: 0
APNEA: 0
CHEST TIGHTNESS: 0

## 2023-08-29 NOTE — TELEPHONE ENCOUNTER
Medication Refill Request    Anjali Stevennicol is requesting a refill of the following medication(s):   Pantoprazole   Please send refill to:      [address], [address] [phone] Eugenio Mitchell [phone]  [facility] [address]  Phone: [phone] Fax: [phone]

## 2023-10-17 ENCOUNTER — OFFICE VISIT (OUTPATIENT)
Age: 58
End: 2023-10-17
Payer: MEDICAID

## 2023-10-17 VITALS
OXYGEN SATURATION: 96 % | HEART RATE: 87 BPM | WEIGHT: 226 LBS | HEIGHT: 64 IN | SYSTOLIC BLOOD PRESSURE: 102 MMHG | BODY MASS INDEX: 38.58 KG/M2 | RESPIRATION RATE: 20 BRPM | DIASTOLIC BLOOD PRESSURE: 71 MMHG

## 2023-10-17 DIAGNOSIS — S39.012A STRAIN OF LUMBAR REGION, INITIAL ENCOUNTER: Primary | ICD-10-CM

## 2023-10-17 PROCEDURE — 99213 OFFICE O/P EST LOW 20 MIN: CPT | Performed by: FAMILY MEDICINE

## 2023-10-17 RX ORDER — IBUPROFEN 800 MG/1
800 TABLET ORAL
Qty: 90 TABLET | Refills: 0 | Status: SHIPPED | OUTPATIENT
Start: 2023-10-17

## 2023-10-17 RX ORDER — CHLORZOXAZONE 500 MG/1
500 TABLET ORAL 4 TIMES DAILY PRN
Qty: 80 TABLET | Refills: 1 | Status: SHIPPED | OUTPATIENT
Start: 2023-10-17

## 2023-10-17 SDOH — ECONOMIC STABILITY: FOOD INSECURITY: WITHIN THE PAST 12 MONTHS, THE FOOD YOU BOUGHT JUST DIDN'T LAST AND YOU DIDN'T HAVE MONEY TO GET MORE.: NEVER TRUE

## 2023-10-17 SDOH — ECONOMIC STABILITY: INCOME INSECURITY: HOW HARD IS IT FOR YOU TO PAY FOR THE VERY BASICS LIKE FOOD, HOUSING, MEDICAL CARE, AND HEATING?: NOT HARD AT ALL

## 2023-10-17 SDOH — ECONOMIC STABILITY: FOOD INSECURITY: WITHIN THE PAST 12 MONTHS, YOU WORRIED THAT YOUR FOOD WOULD RUN OUT BEFORE YOU GOT MONEY TO BUY MORE.: NEVER TRUE

## 2023-10-17 ASSESSMENT — ENCOUNTER SYMPTOMS
SORE THROAT: 0
WHEEZING: 0
DIARRHEA: 0
CHEST TIGHTNESS: 0
SHORTNESS OF BREATH: 0
EYE REDNESS: 0
CONSTIPATION: 0
EYE PAIN: 0
BLOOD IN STOOL: 0
BACK PAIN: 1
ABDOMINAL PAIN: 0
COUGH: 0
FACIAL SWELLING: 0
VOICE CHANGE: 0
ANAL BLEEDING: 0
COLOR CHANGE: 0
SINUS PRESSURE: 0
NAUSEA: 0
ABDOMINAL DISTENTION: 0
RHINORRHEA: 0

## 2023-10-17 ASSESSMENT — ANXIETY QUESTIONNAIRES
4. TROUBLE RELAXING: 0
3. WORRYING TOO MUCH ABOUT DIFFERENT THINGS: 0
5. BEING SO RESTLESS THAT IT IS HARD TO SIT STILL: 0
6. BECOMING EASILY ANNOYED OR IRRITABLE: 0
GAD7 TOTAL SCORE: 0
IF YOU CHECKED OFF ANY PROBLEMS ON THIS QUESTIONNAIRE, HOW DIFFICULT HAVE THESE PROBLEMS MADE IT FOR YOU TO DO YOUR WORK, TAKE CARE OF THINGS AT HOME, OR GET ALONG WITH OTHER PEOPLE: NOT DIFFICULT AT ALL
7. FEELING AFRAID AS IF SOMETHING AWFUL MIGHT HAPPEN: 0
2. NOT BEING ABLE TO STOP OR CONTROL WORRYING: 0
1. FEELING NERVOUS, ANXIOUS, OR ON EDGE: 0

## 2023-10-17 ASSESSMENT — PATIENT HEALTH QUESTIONNAIRE - PHQ9
SUM OF ALL RESPONSES TO PHQ QUESTIONS 1-9: 0
1. LITTLE INTEREST OR PLEASURE IN DOING THINGS: 0
SUM OF ALL RESPONSES TO PHQ QUESTIONS 1-9: 0
2. FEELING DOWN, DEPRESSED OR HOPELESS: 0
SUM OF ALL RESPONSES TO PHQ9 QUESTIONS 1 & 2: 0

## 2023-10-17 NOTE — PROGRESS NOTES
Declan Dowd is a 62 y.o. female who presents with the following:  Chief Complaint   Patient presents with    Lower Back Pain     X 5 days       Patient pulled the left side of her back while getting out of bed this past Friday and now her pain is worse when she is trying to sit down or trying to get up from a sitting position but when she is up she does fairly well when she is laying down in bed she does fairly well but the other times she can feel her back tightening up especially on the left. Patient is having no radiation other than down to her pelvic bone in the back. Allergies   Allergen Reactions    Milk (Cow) Diarrhea    Sulfa Antibiotics Other (See Comments)     Patient states felt \"very sick and had high fever\"       Current Outpatient Medications   Medication Sig Dispense Refill    ibuprofen (ADVIL;MOTRIN) 800 MG tablet Take 1 tablet by mouth 3 times daily (with meals) 90 tablet 0    chlorzoxazone (PARAFON FORTE) 500 MG tablet Take 1 tablet by mouth 4 times daily as needed for Muscle spasms 80 tablet 1    montelukast (SINGULAIR) 10 MG tablet Take 1 tablet by mouth daily 30 tablet 3    pantoprazole (PROTONIX) 40 MG tablet Take 1 tablet by mouth daily 90 tablet 1    Inulin-Cholecalciferol 2.5-500 GM-UNIT CHEW Take 1 tablet by mouth daily      ergocalciferol (ERGOCALCIFEROL) 1.25 MG (92969 UT) capsule Take 1 capsule by mouth every 7 days       No current facility-administered medications for this visit.         Past Medical History:   Diagnosis Date    Blood type O+     IBS (irritable bowel syndrome)     Menopause        Past Surgical History:   Procedure Laterality Date    COLONOSCOPY N/A 8/20/2021    COLONOSCOPY performed by Jeniffer Whelan MD at [address] MAIN OR       Family History   Problem Relation Age of Onset    Diabetes Mother     Diabetes Sister     Breast Cancer Sister         dx age 42's    Diabetes Father     Cancer Father     Diabetes Sister     Thyroid Disease Sister     Thyroid Disease

## 2023-10-26 ENCOUNTER — OFFICE VISIT (OUTPATIENT)
Age: 58
End: 2023-10-26
Payer: MEDICAID

## 2023-10-26 VITALS
DIASTOLIC BLOOD PRESSURE: 78 MMHG | WEIGHT: 227 LBS | HEART RATE: 65 BPM | TEMPERATURE: 98.1 F | SYSTOLIC BLOOD PRESSURE: 130 MMHG | OXYGEN SATURATION: 96 % | BODY MASS INDEX: 38.76 KG/M2 | RESPIRATION RATE: 20 BRPM | HEIGHT: 64 IN

## 2023-10-26 DIAGNOSIS — S39.012D LUMBAR STRAIN, SUBSEQUENT ENCOUNTER: Primary | ICD-10-CM

## 2023-10-26 PROCEDURE — 99213 OFFICE O/P EST LOW 20 MIN: CPT | Performed by: FAMILY MEDICINE

## 2023-10-26 SDOH — ECONOMIC STABILITY: FOOD INSECURITY: WITHIN THE PAST 12 MONTHS, YOU WORRIED THAT YOUR FOOD WOULD RUN OUT BEFORE YOU GOT MONEY TO BUY MORE.: NEVER TRUE

## 2023-10-26 SDOH — ECONOMIC STABILITY: FOOD INSECURITY: WITHIN THE PAST 12 MONTHS, THE FOOD YOU BOUGHT JUST DIDN'T LAST AND YOU DIDN'T HAVE MONEY TO GET MORE.: NEVER TRUE

## 2023-10-26 SDOH — ECONOMIC STABILITY: INCOME INSECURITY: HOW HARD IS IT FOR YOU TO PAY FOR THE VERY BASICS LIKE FOOD, HOUSING, MEDICAL CARE, AND HEATING?: NOT HARD AT ALL

## 2023-10-26 ASSESSMENT — ENCOUNTER SYMPTOMS
EYE REDNESS: 0
FACIAL SWELLING: 0
CONSTIPATION: 0
VOICE CHANGE: 0
WHEEZING: 0
SORE THROAT: 0
CHEST TIGHTNESS: 0
EYE PAIN: 0
SHORTNESS OF BREATH: 0
COUGH: 0
ANAL BLEEDING: 0
BLOOD IN STOOL: 0
NAUSEA: 0
SINUS PRESSURE: 0
ABDOMINAL PAIN: 0
ABDOMINAL DISTENTION: 0
COLOR CHANGE: 0
DIARRHEA: 0
BACK PAIN: 1
RHINORRHEA: 0

## 2023-10-26 ASSESSMENT — PATIENT HEALTH QUESTIONNAIRE - PHQ9
SUM OF ALL RESPONSES TO PHQ QUESTIONS 1-9: 0
2. FEELING DOWN, DEPRESSED OR HOPELESS: 0
SUM OF ALL RESPONSES TO PHQ9 QUESTIONS 1 & 2: 0
1. LITTLE INTEREST OR PLEASURE IN DOING THINGS: 0
SUM OF ALL RESPONSES TO PHQ QUESTIONS 1-9: 0

## 2023-10-26 ASSESSMENT — ANXIETY QUESTIONNAIRES
IF YOU CHECKED OFF ANY PROBLEMS ON THIS QUESTIONNAIRE, HOW DIFFICULT HAVE THESE PROBLEMS MADE IT FOR YOU TO DO YOUR WORK, TAKE CARE OF THINGS AT HOME, OR GET ALONG WITH OTHER PEOPLE: NOT DIFFICULT AT ALL
2. NOT BEING ABLE TO STOP OR CONTROL WORRYING: 0
1. FEELING NERVOUS, ANXIOUS, OR ON EDGE: 0
6. BECOMING EASILY ANNOYED OR IRRITABLE: 0
7. FEELING AFRAID AS IF SOMETHING AWFUL MIGHT HAPPEN: 0
3. WORRYING TOO MUCH ABOUT DIFFERENT THINGS: 0
GAD7 TOTAL SCORE: 0
5. BEING SO RESTLESS THAT IT IS HARD TO SIT STILL: 0
4. TROUBLE RELAXING: 0

## 2023-10-26 NOTE — PROGRESS NOTES
Shaquille Gooden is a 62 y.o. female who presents with the following:  Chief Complaint   Patient presents with    Back Pain       Patient with persistent pain in the lumbar region now more on the right than the left where it was first perceived. Allergies   Allergen Reactions    Milk (Cow) Diarrhea    Sulfa Antibiotics Other (See Comments)     Patient states felt \"very sick and had high fever\"       Current Outpatient Medications   Medication Sig Dispense Refill    ibuprofen (ADVIL;MOTRIN) 800 MG tablet Take 1 tablet by mouth 3 times daily (with meals) 90 tablet 0    chlorzoxazone (PARAFON FORTE) 500 MG tablet Take 1 tablet by mouth 4 times daily as needed for Muscle spasms 80 tablet 1    montelukast (SINGULAIR) 10 MG tablet Take 1 tablet by mouth daily 30 tablet 3    pantoprazole (PROTONIX) 40 MG tablet Take 1 tablet by mouth daily 90 tablet 1    Inulin-Cholecalciferol 2.5-500 GM-UNIT CHEW Take 1 tablet by mouth daily      ergocalciferol (ERGOCALCIFEROL) 1.25 MG (37262 UT) capsule Take 1 capsule by mouth every 7 days       No current facility-administered medications for this visit.         Past Medical History:   Diagnosis Date    Blood type O+     IBS (irritable bowel syndrome)     Menopause        Past Surgical History:   Procedure Laterality Date    COLONOSCOPY N/A 8/20/2021    COLONOSCOPY performed by Ruben Denis MD at [facility]       Family History   Problem Relation Age of Onset    Diabetes Mother     Diabetes Sister     Breast Cancer Sister         dx age 42's    Diabetes Father     Cancer Father     Diabetes Sister     Thyroid Disease Sister     Thyroid Disease Sister     Cancer Mother        Social History     Socioeconomic History    Marital status: Single   Tobacco Use    Smoking status: Never    Smokeless tobacco: Never     Social Determinants of Health     Financial Resource Strain: Low Risk  (10/26/2023)    Overall Financial Resource Strain (CARDIA)     Difficulty of Paying Living

## 2023-11-02 ENCOUNTER — CLINICAL DOCUMENTATION (OUTPATIENT)
Age: 58
End: 2023-11-02

## 2024-04-29 ENCOUNTER — TELEPHONE (OUTPATIENT)
Age: 59
End: 2024-04-29

## 2024-04-29 ENCOUNTER — SCHEDULED TELEPHONE ENCOUNTER (OUTPATIENT)
Age: 59
End: 2024-04-29
Payer: COMMERCIAL

## 2024-04-29 DIAGNOSIS — R31.9 HEMATURIA, UNSPECIFIED TYPE: Primary | ICD-10-CM

## 2024-04-29 DIAGNOSIS — Z12.31 ENCOUNTER FOR SCREENING MAMMOGRAM FOR MALIGNANT NEOPLASM OF BREAST: ICD-10-CM

## 2024-04-29 PROCEDURE — 99442 PR PHYS/QHP TELEPHONE EVALUATION 11-20 MIN: CPT | Performed by: NURSE PRACTITIONER

## 2024-04-29 SDOH — ECONOMIC STABILITY: FOOD INSECURITY: WITHIN THE PAST 12 MONTHS, THE FOOD YOU BOUGHT JUST DIDN'T LAST AND YOU DIDN'T HAVE MONEY TO GET MORE.: NEVER TRUE

## 2024-04-29 SDOH — ECONOMIC STABILITY: FOOD INSECURITY: WITHIN THE PAST 12 MONTHS, YOU WORRIED THAT YOUR FOOD WOULD RUN OUT BEFORE YOU GOT MONEY TO BUY MORE.: NEVER TRUE

## 2024-04-29 SDOH — ECONOMIC STABILITY: INCOME INSECURITY: HOW HARD IS IT FOR YOU TO PAY FOR THE VERY BASICS LIKE FOOD, HOUSING, MEDICAL CARE, AND HEATING?: NOT HARD AT ALL

## 2024-04-29 ASSESSMENT — PATIENT HEALTH QUESTIONNAIRE - PHQ9
SUM OF ALL RESPONSES TO PHQ QUESTIONS 1-9: 0
SUM OF ALL RESPONSES TO PHQ9 QUESTIONS 1 & 2: 0
SUM OF ALL RESPONSES TO PHQ QUESTIONS 1-9: 0
2. FEELING DOWN, DEPRESSED OR HOPELESS: NOT AT ALL
1. LITTLE INTEREST OR PLEASURE IN DOING THINGS: NOT AT ALL

## 2024-04-29 ASSESSMENT — ENCOUNTER SYMPTOMS
CHEST TIGHTNESS: 0
ABDOMINAL DISTENTION: 0
EYE ITCHING: 0
EYE DISCHARGE: 0

## 2024-04-29 NOTE — PROGRESS NOTES
Chief Complaint   Patient presents with    Vaginal Bleeding       There were no vitals filed for this visit.    \"Have you been to the ER, urgent care clinic since your last visit?  Hospitalized since your last visit?\"    NO    “Have you seen or consulted any other health care providers outside of [facility] since your last visit?”    NO     “Have you had a pap smear?”    NO    Date of last Cervical Cancer screen (HPV or PAP): 4/27/2021             Click Here for Release of Records Request

## 2024-04-29 NOTE — PROGRESS NOTES
Cece Quinn is a 59 y.o. female evaluated via audio-only technology on 4/29/2024 for Vaginal Bleeding and Other (Need mammo ordered)  .      Assessment & Plan:   Hematuria, unspecified type  -     AMB POC URINALYSIS DIP STICK AUTO W/O MICRO; Future  Encounter for screening mammogram for malignant neoplasm of breast  -     PARMINDER DIGITAL SCREEN W OR WO CAD BILATERAL; Future    Will check a urine tomorrow. If unremarkable we will plan for a repeat pap and transvaginal ultrasound.     Return in about 1 day (around 4/30/2024) for leave a urine specimen.     Subjective:     Patient evaluated via VV for \"vaginal bleeding.\" Reports \"Scant amount of bright red blood after she wipes.\" Pretty sure the blood is from her vagina. +pelvic pressure as well. She is postmenopausal.     She states a few days ago her back was hurting so she started her muscle relaxer and ibuprofen which helped. A few days later her IBS flared and she had an episode of constipation which resolved with OTC laxatives. She has noticed some vaginal bleeding this morning which concerned her. States her mom had cyst and she wants to make sure \"everything is okay.\" She does not have a GYN.     Advised her to RTO tomorrow to provide us with a urine specimen. She is also due for another pap which she plans to schedule.     Of note, she needs another order for a mammo.    Prior to Admission medications    Medication Sig Start Date End Date Taking? Authorizing Provider   ibuprofen (ADVIL;MOTRIN) 800 MG tablet Take 1 tablet by mouth 3 times daily (with meals) 10/17/23  Yes Miki Christian Jr., MD   chlorzoxazone (PARAFON FORTE) 500 MG tablet Take 1 tablet by mouth 4 times daily as needed for Muscle spasms 10/17/23  Yes Miki Christian Jr., MD   montelukast (SINGULAIR) 10 MG tablet Take 1 tablet by mouth daily 8/29/23  Yes Josselin Baires, JOSH - NP   Inulin-Cholecalciferol 2.5-500 GM-UNIT CHEW Take 1 tablet by mouth daily 2/10/22  Yes Automatic Reconciliation,

## 2024-04-29 NOTE — TELEPHONE ENCOUNTER
Patient called stating she has been having stomach pain & light vaginal bleeding for about 5 days. She has already went through menopause and is very concerned. You are completely booked today & tomorrow and she doesn't have a gyn. What would you suggest?

## 2024-04-30 ENCOUNTER — NURSE ONLY (OUTPATIENT)
Age: 59
End: 2024-04-30
Payer: COMMERCIAL

## 2024-04-30 DIAGNOSIS — R31.9 HEMATURIA, UNSPECIFIED TYPE: Primary | ICD-10-CM

## 2024-04-30 LAB
BILIRUBIN, URINE, POC: NEGATIVE
BLOOD URINE, POC: NORMAL
GLUCOSE URINE, POC: NEGATIVE
KETONES, URINE, POC: NEGATIVE
LEUKOCYTE ESTERASE, URINE, POC: NEGATIVE
NITRITE, URINE, POC: NEGATIVE
PH, URINE, POC: 5 (ref 4.6–8)
PROTEIN,URINE, POC: NEGATIVE
SPECIFIC GRAVITY, URINE, POC: 1.02 (ref 1–1.03)
URINALYSIS CLARITY, POC: NORMAL
URINALYSIS COLOR, POC: YELLOW
UROBILINOGEN, POC: NORMAL

## 2024-04-30 PROCEDURE — 81001 URINALYSIS AUTO W/SCOPE: CPT

## 2024-05-01 NOTE — PROGRESS NOTES
Cece Quinn is a 59 y.o. female who presents today with c/o   Chief Complaint   Patient presents with    Vaginal Bleeding    Abdominal Pain    Bloated    Fatigue           Assessment/ Plan:       ASSESSMENT AND PLAN    1. Vaginal bleeding  Check labs today. Will evaluate right lower quadrant pain and vaginal bleeding with CT. She is concerned about the price and plans to call her insurance before completing this.   Advised to call her insurance to see who is in her network for GYN care. I would like her to see GYN for recommendations and further work up.  - CBC with Auto Differential; Future  - TSH; Future  - CT ABDOMEN PELVIS WO CONTRAST Additional Contrast? Radiologist Recommendation; Future    Will complete PAP in one month if bleeding resolves and she has still not seen GYN    2. Right lower quadrant abdominal pain  Mild tenderness to RLQ in the office today.  Patient is well appearing. Vitals stable  Check labs. CT ordered to evaluate  - CT ABDOMEN PELVIS WO CONTRAST Additional Contrast? Radiologist Recommendation; Future  - Comprehensive Metabolic Panel; Future    Fatigue  Check TSH, CBC, CMP        Return in about 1 month (around 6/2/2024).       Subjective:  Cece Quinn is a 59 y.o. female here for vaginal bleeding. +pelvic pressure as well as  right lower quadrant pain. Feels bloated.     She states a few days ago her back was hurting so she started her muscle relaxer and ibuprofen which helped. A few days later her IBS flared and she had an episode of constipation which resolved with OTC laxatives.  States her mom had cyst on her uterus and she wants to make sure \"everything is okay.\" She does not have a GYN    urinalysis showed hematuria, but no bacteria. Hematuria most likely from vaginal bleeding.    She thinks she is postmenopausal. Although she denies ever having \"menopausal symptoms.\" Has not had a period in three years.      I planned to do a PAP, but I think this would be inaccurate since she is

## 2024-05-02 ENCOUNTER — OFFICE VISIT (OUTPATIENT)
Age: 59
End: 2024-05-02
Payer: COMMERCIAL

## 2024-05-02 VITALS
RESPIRATION RATE: 18 BRPM | HEIGHT: 64 IN | TEMPERATURE: 97 F | BODY MASS INDEX: 38.41 KG/M2 | OXYGEN SATURATION: 98 % | DIASTOLIC BLOOD PRESSURE: 68 MMHG | SYSTOLIC BLOOD PRESSURE: 140 MMHG | WEIGHT: 225 LBS | HEART RATE: 78 BPM

## 2024-05-02 DIAGNOSIS — R10.31 RIGHT LOWER QUADRANT ABDOMINAL PAIN: ICD-10-CM

## 2024-05-02 DIAGNOSIS — N93.9 VAGINAL BLEEDING: Primary | ICD-10-CM

## 2024-05-02 DIAGNOSIS — R53.83 FATIGUE, UNSPECIFIED TYPE: ICD-10-CM

## 2024-05-02 PROCEDURE — 99214 OFFICE O/P EST MOD 30 MIN: CPT | Performed by: NURSE PRACTITIONER

## 2024-05-02 ASSESSMENT — ENCOUNTER SYMPTOMS
EYE DISCHARGE: 0
CHEST TIGHTNESS: 0
ABDOMINAL DISTENTION: 1
BLOOD IN STOOL: 0
VOMITING: 0
BACK PAIN: 1
EYE ITCHING: 0
ABDOMINAL PAIN: 1
DIARRHEA: 0

## 2024-05-02 NOTE — PROGRESS NOTES
\"Have you been to the ER, urgent care clinic since your last visit?  Hospitalized since your last visit?\"    NO    “Have you seen or consulted any other health care providers outside of [facility] since your last visit?”    NO     “Have you had a pap smear?”    NO    Date of last Cervical Cancer screen (HPV or PAP): 4/27/2021             Click Here for Release of Records Request

## 2024-05-03 LAB
ALBUMIN SERPL-MCNC: 4.6 G/DL (ref 3.8–4.9)
ALBUMIN/GLOB SERPL: 1.8 {RATIO} (ref 1.2–2.2)
ALP SERPL-CCNC: 133 IU/L (ref 44–121)
ALT SERPL-CCNC: 20 IU/L (ref 0–32)
AST SERPL-CCNC: 20 IU/L (ref 0–40)
BASOPHILS # BLD AUTO: 0.1 X10E3/UL (ref 0–0.2)
BASOPHILS NFR BLD AUTO: 1 %
BILIRUB SERPL-MCNC: 0.6 MG/DL (ref 0–1.2)
BUN SERPL-MCNC: 21 MG/DL (ref 6–24)
BUN/CREAT SERPL: 19 (ref 9–23)
CALCIUM SERPL-MCNC: 9.3 MG/DL (ref 8.7–10.2)
CHLORIDE SERPL-SCNC: 103 MMOL/L (ref 96–106)
CO2 SERPL-SCNC: 19 MMOL/L (ref 20–29)
CREAT SERPL-MCNC: 1.11 MG/DL (ref 0.57–1)
EGFRCR SERPLBLD CKD-EPI 2021: 57 ML/MIN/1.73
EOSINOPHIL # BLD AUTO: 0.1 X10E3/UL (ref 0–0.4)
EOSINOPHIL NFR BLD AUTO: 1 %
ERYTHROCYTE [DISTWIDTH] IN BLOOD BY AUTOMATED COUNT: 12.6 % (ref 11.7–15.4)
GLOBULIN SER CALC-MCNC: 2.6 G/DL (ref 1.5–4.5)
GLUCOSE SERPL-MCNC: 87 MG/DL (ref 70–99)
HCT VFR BLD AUTO: 44.4 % (ref 34–46.6)
HGB BLD-MCNC: 14.7 G/DL (ref 11.1–15.9)
IMM GRANULOCYTES # BLD AUTO: 0 X10E3/UL (ref 0–0.1)
IMM GRANULOCYTES NFR BLD AUTO: 0 %
LYMPHOCYTES # BLD AUTO: 2.4 X10E3/UL (ref 0.7–3.1)
LYMPHOCYTES NFR BLD AUTO: 27 %
MCH RBC QN AUTO: 30.8 PG (ref 26.6–33)
MCHC RBC AUTO-ENTMCNC: 33.1 G/DL (ref 31.5–35.7)
MCV RBC AUTO: 93 FL (ref 79–97)
MONOCYTES # BLD AUTO: 0.5 X10E3/UL (ref 0.1–0.9)
MONOCYTES NFR BLD AUTO: 6 %
NEUTROPHILS # BLD AUTO: 6.1 X10E3/UL (ref 1.4–7)
NEUTROPHILS NFR BLD AUTO: 65 %
PLATELET # BLD AUTO: 247 X10E3/UL (ref 150–450)
POTASSIUM SERPL-SCNC: 4.4 MMOL/L (ref 3.5–5.2)
PROT SERPL-MCNC: 7.2 G/DL (ref 6–8.5)
RBC # BLD AUTO: 4.78 X10E6/UL (ref 3.77–5.28)
REPORT: NORMAL
SODIUM SERPL-SCNC: 142 MMOL/L (ref 134–144)
TSH SERPL DL<=0.005 MIU/L-ACNC: 1.78 UIU/ML (ref 0.45–4.5)
WBC # BLD AUTO: 9.2 X10E3/UL (ref 3.4–10.8)

## 2024-06-19 ENCOUNTER — OFFICE VISIT (OUTPATIENT)
Age: 59
End: 2024-06-19
Payer: COMMERCIAL

## 2024-06-19 VITALS
HEIGHT: 64 IN | OXYGEN SATURATION: 97 % | BODY MASS INDEX: 39.09 KG/M2 | RESPIRATION RATE: 18 BRPM | SYSTOLIC BLOOD PRESSURE: 137 MMHG | WEIGHT: 229 LBS | TEMPERATURE: 97 F | HEART RATE: 77 BPM | DIASTOLIC BLOOD PRESSURE: 72 MMHG

## 2024-06-19 DIAGNOSIS — M79.89 LEG SWELLING: ICD-10-CM

## 2024-06-19 DIAGNOSIS — M62.838 MUSCLE SPASMS OF NECK: ICD-10-CM

## 2024-06-19 DIAGNOSIS — R42 VERTIGO: Primary | ICD-10-CM

## 2024-06-19 PROCEDURE — 99214 OFFICE O/P EST MOD 30 MIN: CPT | Performed by: NURSE PRACTITIONER

## 2024-06-19 RX ORDER — MECLIZINE HYDROCHLORIDE 25 MG/1
25 TABLET ORAL 3 TIMES DAILY PRN
Qty: 30 TABLET | Refills: 1 | Status: SHIPPED | OUTPATIENT
Start: 2024-06-19 | End: 2024-06-29

## 2024-06-19 RX ORDER — HYDROCHLOROTHIAZIDE 25 MG/1
25 TABLET ORAL DAILY PRN
Qty: 60 TABLET | Refills: 0 | Status: SHIPPED | OUTPATIENT
Start: 2024-06-19

## 2024-06-19 RX ORDER — CHLORZOXAZONE 500 MG/1
500 TABLET ORAL 3 TIMES DAILY PRN
Qty: 60 TABLET | Refills: 0 | Status: SHIPPED | OUTPATIENT
Start: 2024-06-19

## 2024-06-19 ASSESSMENT — PATIENT HEALTH QUESTIONNAIRE - PHQ9
4. FEELING TIRED OR HAVING LITTLE ENERGY: NOT AT ALL
2. FEELING DOWN, DEPRESSED OR HOPELESS: NOT AT ALL
SUM OF ALL RESPONSES TO PHQ QUESTIONS 1-9: 0
5. POOR APPETITE OR OVEREATING: NOT AT ALL
1. LITTLE INTEREST OR PLEASURE IN DOING THINGS: NOT AT ALL
SUM OF ALL RESPONSES TO PHQ QUESTIONS 1-9: 0
10. IF YOU CHECKED OFF ANY PROBLEMS, HOW DIFFICULT HAVE THESE PROBLEMS MADE IT FOR YOU TO DO YOUR WORK, TAKE CARE OF THINGS AT HOME, OR GET ALONG WITH OTHER PEOPLE: NOT DIFFICULT AT ALL
SUM OF ALL RESPONSES TO PHQ QUESTIONS 1-9: 0
8. MOVING OR SPEAKING SO SLOWLY THAT OTHER PEOPLE COULD HAVE NOTICED. OR THE OPPOSITE, BEING SO FIGETY OR RESTLESS THAT YOU HAVE BEEN MOVING AROUND A LOT MORE THAN USUAL: NOT AT ALL
9. THOUGHTS THAT YOU WOULD BE BETTER OFF DEAD, OR OF HURTING YOURSELF: NOT AT ALL
SUM OF ALL RESPONSES TO PHQ9 QUESTIONS 1 & 2: 0
6. FEELING BAD ABOUT YOURSELF - OR THAT YOU ARE A FAILURE OR HAVE LET YOURSELF OR YOUR FAMILY DOWN: NOT AT ALL
3. TROUBLE FALLING OR STAYING ASLEEP: NOT AT ALL
SUM OF ALL RESPONSES TO PHQ QUESTIONS 1-9: 0
7. TROUBLE CONCENTRATING ON THINGS, SUCH AS READING THE NEWSPAPER OR WATCHING TELEVISION: NOT AT ALL

## 2024-06-19 NOTE — PROGRESS NOTES
of 3 - 3-dose series) Never done    HIV screen  Never done    A1C test (Diabetic or Prediabetic)  06/14/2023    COVID-19 Vaccine (5 - 2023-24 season) 09/01/2023    Cervical cancer screen  04/27/2024    Breast cancer screen  06/23/2024         No follow-up provider specified.      Belkis Winkler, NP

## 2024-06-24 ENCOUNTER — HOSPITAL ENCOUNTER (OUTPATIENT)
Facility: HOSPITAL | Age: 59
Discharge: HOME | End: 2024-06-27
Payer: COMMERCIAL

## 2024-06-24 DIAGNOSIS — Z12.31 ENCOUNTER FOR SCREENING MAMMOGRAM FOR MALIGNANT NEOPLASM OF BREAST: ICD-10-CM

## 2024-06-24 PROCEDURE — 77067 SCR MAMMO BI INCL CAD: CPT

## 2024-07-22 ENCOUNTER — ANESTHESIA EVENT (OUTPATIENT)
Facility: HOSPITAL | Age: 59
End: 2024-07-22
Payer: COMMERCIAL

## 2024-07-22 NOTE — ANESTHESIA PRE PROCEDURE
Department of Anesthesiology  Preprocedure Note       Name:  Cece Quinn   Age:  59 y.o.  :  1965                                          MRN:  [id number]         Date:  2024      Surgeon: Surgeon(s):  Fortunato Clemons MD    Procedure: Procedure(s):  HYSTEROSCOPY, DILATION AND CURETTAGE, POSSIBLE POLYPECTOMY    Medications prior to admission:   Prior to Admission medications    Medication Sig Start Date End Date Taking? Authorizing Provider   hydroCHLOROthiazide (HYDRODIURIL) 25 MG tablet Take 1 tablet by mouth daily as needed (swelling) 24   Belkis Winkler APRN - NP   chlorzoxazone (PARAFON FORTE) 500 MG tablet Take 1 tablet by mouth 3 times daily as needed for Muscle spasms 24   Belkis Winkler APRN - NP   ibuprofen (ADVIL;MOTRIN) 800 MG tablet Take 1 tablet by mouth 3 times daily (with meals) 10/17/23   Miki Christian Jr., MD   montelukast (SINGULAIR) 10 MG tablet Take 1 tablet by mouth daily 23   Josselin Baires APRN - NP       Current medications:    No current facility-administered medications for this encounter.     Current Outpatient Medications   Medication Sig Dispense Refill   • hydroCHLOROthiazide (HYDRODIURIL) 25 MG tablet Take 1 tablet by mouth daily as needed (swelling) 60 tablet 0   • chlorzoxazone (PARAFON FORTE) 500 MG tablet Take 1 tablet by mouth 3 times daily as needed for Muscle spasms 60 tablet 0   • ibuprofen (ADVIL;MOTRIN) 800 MG tablet Take 1 tablet by mouth 3 times daily (with meals) 90 tablet 0   • montelukast (SINGULAIR) 10 MG tablet Take 1 tablet by mouth daily 30 tablet 3       Allergies:    Allergies   Allergen Reactions   • Milk (Cow) Diarrhea   • Sulfa Antibiotics Other (See Comments)     Patient states felt \"very sick and had high fever\"       Problem List:    Patient Active Problem List   Diagnosis Code   • DDD (degenerative disc disease), cervical M50.30   • Gastroesophageal reflux disease without esophagitis K21.9   • Generalized

## 2024-07-29 RX ORDER — OMEPRAZOLE 20 MG/1
20 CAPSULE, DELAYED RELEASE ORAL DAILY
COMMUNITY

## 2024-07-29 NOTE — PERIOP NOTE
[facility]  SURGICAL PRE-ADMISSION INSTRUCTIONS    ARRIVAL  You will be called the day before your surgery with your expected arrival time.  Sign in at the  of the hospital.  You will be directed to the Surgical Waiting Room.  Please arrive at your scheduled appointment time.  You have been scheduled to arrive for your procedure one or two hours prior to the expected start time of your procedure.  Every effort will be made to minimize your wait but please be aware that unforeseen circumstances may affect our schedule.    EATING  DO NOT EAT OR DRINK ANYTHING AFTER MIDNIGHT ON THE EVENING BEFORE YOUR SURGERY OR ON THE DAY OF YOUR SURGERY except for your medications (as instructed) with a sip of water.  Do not use gum, mints or lozenges on the morning of your surgery.  Please do not smoke or chew tobacco before your surgery.    MEDICATIONS   Take the following medications on the morning of your surgery with the smallest amount of water possible : omeprazole    STOP THESE MEDICATIONS AT THE TIMES LISTED BELOW  NSAIDS (Indocin, Ibuprofen, Naprosyn) ;  7 days before     DRIVING/TRANSPORATION  Have a responsible adult to drive you home from the hospital and to stay with you over night.  Please have them plan to remain in the hospital during your surgery.  Your surgery will not be done if you do not have a responsible adult to take you home and to stay with you.  If you have arranged for public transport, you must have a responsible adult to ride with you (who is not the ).  You may not drive for 24 hours after anesthesia.    PREPARATION  If you have a Living WiIl/Advance Directive, please bring a copy with you to scan into your chart.   Please DO NOT wear makeup or nail polish  Please leave valuables at home,  DO NOT wear jewelry.  Wear loose, comfortable clothing that is large enough to cover a bulky dressing.    SPECIAL INSTRUCTIONS:       Reviewed above preoperative

## 2024-08-05 ENCOUNTER — HOSPITAL ENCOUNTER (OUTPATIENT)
Facility: HOSPITAL | Age: 59
Setting detail: OUTPATIENT SURGERY
Discharge: HOME | End: 2024-08-05
Attending: OBSTETRICS & GYNECOLOGY | Admitting: OBSTETRICS & GYNECOLOGY
Payer: COMMERCIAL

## 2024-08-05 ENCOUNTER — ANESTHESIA (OUTPATIENT)
Facility: HOSPITAL | Age: 59
End: 2024-08-05
Payer: COMMERCIAL

## 2024-08-05 VITALS
TEMPERATURE: 98.1 F | SYSTOLIC BLOOD PRESSURE: 126 MMHG | HEIGHT: 64 IN | BODY MASS INDEX: 38.41 KG/M2 | WEIGHT: 225 LBS | RESPIRATION RATE: 16 BRPM | DIASTOLIC BLOOD PRESSURE: 57 MMHG | HEART RATE: 57 BPM | OXYGEN SATURATION: 93 %

## 2024-08-05 LAB — HCG UR QL: NEGATIVE

## 2024-08-05 PROCEDURE — 3700000000 HC ANESTHESIA ATTENDED CARE: Performed by: OBSTETRICS & GYNECOLOGY

## 2024-08-05 PROCEDURE — 88305 TISSUE EXAM BY PATHOLOGIST: CPT

## 2024-08-05 PROCEDURE — 3700000001 HC ADD 15 MINUTES (ANESTHESIA): Performed by: OBSTETRICS & GYNECOLOGY

## 2024-08-05 PROCEDURE — 6360000002 HC RX W HCPCS: Performed by: OBSTETRICS & GYNECOLOGY

## 2024-08-05 PROCEDURE — 7100000000 HC PACU RECOVERY - FIRST 15 MIN: Performed by: OBSTETRICS & GYNECOLOGY

## 2024-08-05 PROCEDURE — 81025 URINE PREGNANCY TEST: CPT

## 2024-08-05 PROCEDURE — 3600000004 HC SURGERY LEVEL 4 BASE: Performed by: OBSTETRICS & GYNECOLOGY

## 2024-08-05 PROCEDURE — 7100000001 HC PACU RECOVERY - ADDTL 15 MIN: Performed by: OBSTETRICS & GYNECOLOGY

## 2024-08-05 PROCEDURE — 6360000002 HC RX W HCPCS: Performed by: ANESTHESIOLOGY

## 2024-08-05 PROCEDURE — 3600000014 HC SURGERY LEVEL 4 ADDTL 15MIN: Performed by: OBSTETRICS & GYNECOLOGY

## 2024-08-05 PROCEDURE — 2580000003 HC RX 258: Performed by: OBSTETRICS & GYNECOLOGY

## 2024-08-05 PROCEDURE — 2709999900 HC NON-CHARGEABLE SUPPLY: Performed by: OBSTETRICS & GYNECOLOGY

## 2024-08-05 PROCEDURE — 2720000010 HC SURG SUPPLY STERILE: Performed by: OBSTETRICS & GYNECOLOGY

## 2024-08-05 RX ORDER — SODIUM CHLORIDE 0.9 % (FLUSH) 0.9 %
5-40 SYRINGE (ML) INJECTION EVERY 12 HOURS SCHEDULED
Status: DISCONTINUED | OUTPATIENT
Start: 2024-08-05 | End: 2024-08-05 | Stop reason: HOSPADM

## 2024-08-05 RX ORDER — NALOXONE HYDROCHLORIDE 0.4 MG/ML
INJECTION, SOLUTION INTRAMUSCULAR; INTRAVENOUS; SUBCUTANEOUS PRN
Status: DISCONTINUED | OUTPATIENT
Start: 2024-08-05 | End: 2024-08-05 | Stop reason: HOSPADM

## 2024-08-05 RX ORDER — SODIUM CHLORIDE 0.9 % (FLUSH) 0.9 %
5-40 SYRINGE (ML) INJECTION PRN
Status: DISCONTINUED | OUTPATIENT
Start: 2024-08-05 | End: 2024-08-05 | Stop reason: HOSPADM

## 2024-08-05 RX ORDER — FENTANYL CITRATE 50 UG/ML
INJECTION, SOLUTION INTRAMUSCULAR; INTRAVENOUS PRN
Status: DISCONTINUED | OUTPATIENT
Start: 2024-08-05 | End: 2024-08-05 | Stop reason: SDUPTHER

## 2024-08-05 RX ORDER — SODIUM CHLORIDE 9 MG/ML
INJECTION, SOLUTION INTRAVENOUS PRN
Status: DISCONTINUED | OUTPATIENT
Start: 2024-08-05 | End: 2024-08-05 | Stop reason: HOSPADM

## 2024-08-05 RX ORDER — BUPIVACAINE HYDROCHLORIDE 2.5 MG/ML
INJECTION, SOLUTION EPIDURAL; INFILTRATION; INTRACAUDAL PRN
Status: DISCONTINUED | OUTPATIENT
Start: 2024-08-05 | End: 2024-08-05 | Stop reason: ALTCHOICE

## 2024-08-05 RX ORDER — BUPIVACAINE HYDROCHLORIDE 2.5 MG/ML
30 INJECTION, SOLUTION EPIDURAL; INFILTRATION; INTRACAUDAL ONCE
Status: DISCONTINUED | OUTPATIENT
Start: 2024-08-05 | End: 2024-08-05 | Stop reason: HOSPADM

## 2024-08-05 RX ORDER — ONDANSETRON 2 MG/ML
INJECTION INTRAMUSCULAR; INTRAVENOUS PRN
Status: DISCONTINUED | OUTPATIENT
Start: 2024-08-05 | End: 2024-08-05 | Stop reason: SDUPTHER

## 2024-08-05 RX ORDER — DROPERIDOL 2.5 MG/ML
INJECTION, SOLUTION INTRAMUSCULAR; INTRAVENOUS PRN
Status: DISCONTINUED | OUTPATIENT
Start: 2024-08-05 | End: 2024-08-05 | Stop reason: SDUPTHER

## 2024-08-05 RX ORDER — MIDAZOLAM HYDROCHLORIDE 1 MG/ML
INJECTION INTRAMUSCULAR; INTRAVENOUS PRN
Status: DISCONTINUED | OUTPATIENT
Start: 2024-08-05 | End: 2024-08-05 | Stop reason: SDUPTHER

## 2024-08-05 RX ORDER — KETOROLAC TROMETHAMINE 30 MG/ML
INJECTION, SOLUTION INTRAMUSCULAR; INTRAVENOUS PRN
Status: DISCONTINUED | OUTPATIENT
Start: 2024-08-05 | End: 2024-08-05 | Stop reason: SDUPTHER

## 2024-08-05 RX ORDER — PROPOFOL 10 MG/ML
INJECTION, EMULSION INTRAVENOUS PRN
Status: DISCONTINUED | OUTPATIENT
Start: 2024-08-05 | End: 2024-08-05 | Stop reason: SDUPTHER

## 2024-08-05 RX ORDER — FENTANYL CITRATE 50 UG/ML
25 INJECTION, SOLUTION INTRAMUSCULAR; INTRAVENOUS EVERY 5 MIN PRN
Status: DISCONTINUED | OUTPATIENT
Start: 2024-08-05 | End: 2024-08-05 | Stop reason: HOSPADM

## 2024-08-05 RX ORDER — SODIUM CHLORIDE, SODIUM LACTATE, POTASSIUM CHLORIDE, CALCIUM CHLORIDE 600; 310; 30; 20 MG/100ML; MG/100ML; MG/100ML; MG/100ML
INJECTION, SOLUTION INTRAVENOUS CONTINUOUS
Status: DISCONTINUED | OUTPATIENT
Start: 2024-08-05 | End: 2024-08-05 | Stop reason: HOSPADM

## 2024-08-05 RX ADMIN — DROPERIDOL 0.62 MG: 2.5 INJECTION, SOLUTION INTRAMUSCULAR; INTRAVENOUS at 08:21

## 2024-08-05 RX ADMIN — KETOROLAC TROMETHAMINE 30 MG: 30 INJECTION, SOLUTION INTRAMUSCULAR at 08:40

## 2024-08-05 RX ADMIN — PROPOFOL 200 MG: 10 INJECTION, EMULSION INTRAVENOUS at 08:21

## 2024-08-05 RX ADMIN — SODIUM CHLORIDE, POTASSIUM CHLORIDE, SODIUM LACTATE AND CALCIUM CHLORIDE: 600; 310; 30; 20 INJECTION, SOLUTION INTRAVENOUS at 07:11

## 2024-08-05 RX ADMIN — MIDAZOLAM HYDROCHLORIDE 2 MG: 1 INJECTION, SOLUTION INTRAMUSCULAR; INTRAVENOUS at 08:21

## 2024-08-05 RX ADMIN — FENTANYL CITRATE 100 MCG: 50 INJECTION, SOLUTION INTRAMUSCULAR; INTRAVENOUS at 08:21

## 2024-08-05 RX ADMIN — ONDANSETRON 4 MG: 2 INJECTION INTRAMUSCULAR; INTRAVENOUS at 08:40

## 2024-08-05 ASSESSMENT — PAIN SCALES - GENERAL
PAINLEVEL_OUTOF10: 1
PAINLEVEL_OUTOF10: 1

## 2024-08-05 ASSESSMENT — PAIN DESCRIPTION - LOCATION
LOCATION: ABDOMEN
LOCATION: ABDOMEN

## 2024-08-05 ASSESSMENT — PAIN DESCRIPTION - DESCRIPTORS
DESCRIPTORS: CRAMPING
DESCRIPTORS: CRAMPING

## 2024-08-05 ASSESSMENT — PAIN DESCRIPTION - ORIENTATION
ORIENTATION: LOWER
ORIENTATION: LOWER

## 2024-08-05 ASSESSMENT — PAIN DESCRIPTION - PAIN TYPE
TYPE: SURGICAL PAIN
TYPE: SURGICAL PAIN

## 2024-08-05 NOTE — BRIEF OP NOTE
Brief Postoperative Note      Patient: Cece Quinn  YOB: 1965  MRN: [id number]    Date of Procedure: 8/5/2024    Pre-Op Diagnosis Codes:     * Postmenopausal bleeding [N95.0]     * Stenosis, cervix [N88.2]    Post-Op Diagnosis: Same , thickened endometrium, endometrial polyp       Procedure(s):  HYSTEROSCOPY, DILATION AND CURETTAGE, POLYPECTOMY    Surgeon(s):  Fortunato Clemons MD    Assistant:  None    Anesthesia: General/ET, paracervical block using 10 mL of 0.25% marcaine without epinephrine    Estimated Blood Loss (mL): Minimal    Hysteroscopic fluid deficit: 55 mL    Complications: None    Specimens:   ID Type Source Tests Collected by Time Destination   1 : ECC Tissue Cervix SURGICAL PATHOLOGY Fortunato Clemons MD 8/5/2024 0840    2 : Endomentrial samplings and polypectomy Tissue Endometrium SURGICAL PATHOLOGY Fortunato Clemons MD 8/5/2024 0847        Implants:  * No implants in log *      Drains: * No LDAs found *    Findings:  Infection Present At Time Of Surgery (PATOS) (choose all levels that have infection present):  No infection present  Other Findings: cervix stenotic, anterior endometrial polyp, both fallopian tube visualized, uterus sounded to 8cm    Surgical dictation confirmation number: [id number]    Electronically signed by Fortunato Clemons MD on 8/5/2024 at 8:58 AM

## 2024-08-05 NOTE — OP NOTE
[address]  [address]                            OPERATIVE REPORT      PATIENT NAME: CARLEE WARD                 : 1965  MED REC NO: [id number]                       ROOM: OR  ACCOUNT NO: [id number]                       ADMIT DATE: 2024  PROVIDER: Fortunato Clemons MD    DATE OF SERVICE:  2024    PREOPERATIVE DIAGNOSES:  Postmenopausal bleeding, thickened endometrium, cervical stenosis.    POSTOPERATIVE DIAGNOSES:  Postmenopausal bleeding, thickened endometrium, cervical stenosis, endometrial polyp.    PROCEDURES PERFORMED:  Hysteroscopy, dilation and curettage, polypectomy.    SURGEON:  Fortunato Clemons MD    ASSISTANT:  None.    ANESTHESIA:  General/ET and paracervical block utilizing 10 mL of 0.25% Marcaine without epinephrine.    ESTIMATED BLOOD LOSS:  Minimal.    SPECIMENS REMOVED:       1. ECC.       2. Endometrial curettings and polypectomy specimen.    INTRAOPERATIVE FINDINGS:  Cervix stenotic, anterior endometrial polyp.  Both fallopian tubes are visualized.  Uterus sounded to 8 cm.     COMPLICATIONS:  None.    IMPLANTS:   None.    INDICATIONS:  Surgical talk at the office at our preop visit, we discussed risks of surgery including infection, bleeding, damage to bowel, bladder, adjacent organs secondary to uterine perforation.  We discussed the rare risk of death.  We discussed risk of nerve injury secondary to leg placement.  We discussed limitations of curettage and endometrial sampling.  Alternatives were discussed.  Questions answered and permit was signed.    DESCRIPTION OF PROCEDURE:  The patient was taken to the operating room, placed on the operating table.  General anesthesia was administered.  ET tube was placed.  Carefully, she was put in dorsal supine position utilizing the Yellofin leg holders where she was prepped with Betadine vaginally, vulvar, and perineally, draped in normal fashion for this

## 2024-08-05 NOTE — ANESTHESIA POSTPROCEDURE EVALUATION
Department of Anesthesiology  Postprocedure Note    Patient: Cece Quinn  MRN: [id number]  YOB: 1965  Date of evaluation: 8/5/2024    Procedure Summary       Date: 08/05/24 Room / Location: [facility] MAIN OR [address] [place] [address] MAIN OR    Anesthesia Start: 0814 Anesthesia Stop: 0857    Procedure: HYSTEROSCOPY, DILATION AND CURETTAGE, POLYPECTOMY (Vagina ) Diagnosis:       Postmenopausal bleeding      Stenosis, cervix      (Postmenopausal bleeding [N95.0])      (Stenosis, cervix [N88.2])    Surgeons: Fortunato Clemons MD Responsible Provider: Bhakti Fraser MD    Anesthesia Type: general ASA Status: 3            Anesthesia Type: No value filed.    Matilda Phase I: Matilda Score: 10    Matilda Phase II:      Anesthesia Post Evaluation    Patient location during evaluation: bedside  Patient participation: complete - patient participated  Level of consciousness: awake and alert  Pain score: 0  Airway patency: patent  Nausea & Vomiting: no nausea  Cardiovascular status: hemodynamically stable  Respiratory status: acceptable  Hydration status: stable  Pain management: adequate    No notable events documented.

## 2024-08-05 NOTE — DISCHARGE INSTRUCTIONS
[facility]  Fortunato Clemons MD  [address] [place]  [address]    Outpatient Surgery Discharge Instructions      It is important that you take the medication exactly as they are prescribed.   Do not take other medications without consulting your doctor.    Activity:  No lifting straining or exertional activities for 2 to 3 days.  You may take a shower tomorrow.  No driving for 2 to 3 days or while taking narcotics for pain.  Do not return to work for 2 to 4 days after procedure, if want/need to return to work sooner please call the Doctor's office.  Nothing in vagina for 2 weeks      Recommended Diet: regular diet       Follow-Up Care:  2 weeks with Dr. Fortunato Clemons as scheduled.  [phone]    Additional Information:  If you experience any of the following symptoms then please call me or return to the emergency room if you cannot contact your doctor:  Increased vaginal bleeding  Fever  Chills  Nausea  Vomiting  Diarrhea  Change in mentation  Falling  Bleeding  Shortness of breath

## 2024-08-05 NOTE — H&P
I saw patient prior to procedure today. She is doing well without complaint, no change in PMH, PSH, Allergies, Medications, ROS or exam.  She is in agreement with planned procedure today.  Questions answered.  
puncture - Testing for meningitis    GYN History  Reviewed GYN History  Date of LMP: (Notes: postmenopausal).  Date of Last Pap Smear: 2024 (Notes: NIL/HPV-).  Date of HPV testin2024 (Notes: Negative).  Age at Menarche: 11.  Sexually Active?: Y.  STIs/STDs: N.  Current Birth Control Method: Menopause.  Date of Last Mammogram: 2023 (Notes: Normal per pt).  Date of Last Colonoscopy: 2022 (Notes: Normal per pt).    Obstetric History  Reviewed Obstetric History  TOTAL FULL PRE AB. I AB. S ECTOPICS MULTIPLE LIVING  0           Past Medical History  Discussed Past Medical History  Arthritis: Y  IBS: Y - IBS- mixed  Screening  Name Score Notes  [place] Risk Assessment (VTE Risk in the Surgical Patient) 4     ROS  Additionally reports: Except as noted in the HPI, the review of systems is negative for General, Breast, , Resp, GI, CV, Endo, MS, Psych and Heme.    Physical Exam  Constitutional: General Appearance: well developed and nourished and pleasant. Level of Distress: no acute distress. Ambulation: ambulating normally.    Head: Head: normocephalic.    Eyes: Extraocular Movements extraocular movements intact.    Ears, Nose, Mouth, Throat: Ears normal hearing. Nose: no external nose lesions.    Neck: Appearance FROM and supple. Thyroid: non-tender and no enlargement.    Lungs / Chest: Respiratory effort: unlabored. Inspection / Palpation: no deformity, tenderness, or swelling. Auscultation: no wheezing or rales/crackles. Percussion: no dullness or hyperresonance.    Cardiovascular: Rate And Rhythm: regular. Heart Sounds: no murmurs. Extremities: no cyanosis or edema.    Abdomen: Inspection and Palpation: no tenderness, guarding, masses, rebound tenderness, or CVA tenderness and soft and non-distended. Liver: non-tender; no masses. Spleen: no masses. Hernia: none palpable.    Female : External genitalia: no lesions, rash, or erythema. Vagina: no discharge, mass, or tenderness. Cervix: no

## 2024-08-12 RX ORDER — HYDROCHLOROTHIAZIDE 25 MG/1
TABLET ORAL
Qty: 60 TABLET | Refills: 0 | Status: SHIPPED | OUTPATIENT
Start: 2024-08-12

## 2024-09-19 RX ORDER — MONTELUKAST SODIUM 10 MG/1
10 TABLET ORAL DAILY
Qty: 30 TABLET | Refills: 0 | Status: SHIPPED | OUTPATIENT
Start: 2024-09-19

## 2024-10-04 RX ORDER — HYDROCHLOROTHIAZIDE 25 MG/1
TABLET ORAL
Qty: 60 TABLET | Refills: 0 | Status: SHIPPED | OUTPATIENT
Start: 2024-10-04

## 2024-10-19 RX ORDER — MONTELUKAST SODIUM 10 MG/1
10 TABLET ORAL DAILY
Qty: 30 TABLET | Refills: 0 | Status: SHIPPED | OUTPATIENT
Start: 2024-10-19

## 2024-11-18 RX ORDER — MONTELUKAST SODIUM 10 MG/1
10 TABLET ORAL DAILY
Qty: 30 TABLET | Refills: 0 | Status: SHIPPED | OUTPATIENT
Start: 2024-11-18

## 2024-12-23 RX ORDER — MONTELUKAST SODIUM 10 MG/1
10 TABLET ORAL DAILY
Qty: 30 TABLET | Refills: 0 | Status: SHIPPED | OUTPATIENT
Start: 2024-12-23

## 2024-12-27 ENCOUNTER — HOSPITAL ENCOUNTER (EMERGENCY)
Facility: HOSPITAL | Age: 59
Discharge: HOME | End: 2024-12-27
Attending: EMERGENCY MEDICINE
Payer: COMMERCIAL

## 2024-12-27 ENCOUNTER — APPOINTMENT (OUTPATIENT)
Facility: HOSPITAL | Age: 59
End: 2024-12-27
Payer: COMMERCIAL

## 2024-12-27 VITALS
TEMPERATURE: 98.3 F | WEIGHT: 214 LBS | DIASTOLIC BLOOD PRESSURE: 76 MMHG | BODY MASS INDEX: 36.54 KG/M2 | SYSTOLIC BLOOD PRESSURE: 129 MMHG | HEIGHT: 64 IN | OXYGEN SATURATION: 99 % | RESPIRATION RATE: 19 BRPM | HEART RATE: 59 BPM

## 2024-12-27 DIAGNOSIS — R42 DIZZINESS: Primary | ICD-10-CM

## 2024-12-27 LAB
ALBUMIN SERPL-MCNC: 3.5 G/DL (ref 3.5–5)
ALBUMIN/GLOB SERPL: 1 (ref 1.1–2.2)
ALP SERPL-CCNC: 125 U/L (ref 45–117)
ALT SERPL-CCNC: 25 U/L (ref 12–78)
ANION GAP SERPL CALC-SCNC: 5 MMOL/L (ref 2–12)
AST SERPL-CCNC: 9 U/L (ref 15–37)
BASOPHILS # BLD: 0.1 K/UL (ref 0–0.1)
BASOPHILS NFR BLD: 1 % (ref 0–1)
BILIRUB SERPL-MCNC: 0.4 MG/DL (ref 0.2–1)
BUN SERPL-MCNC: 16 MG/DL (ref 6–20)
BUN/CREAT SERPL: 17 (ref 12–20)
CALCIUM SERPL-MCNC: 8.5 MG/DL (ref 8.5–10.1)
CHLORIDE SERPL-SCNC: 106 MMOL/L (ref 97–108)
CO2 SERPL-SCNC: 29 MMOL/L (ref 21–32)
CREAT SERPL-MCNC: 0.96 MG/DL (ref 0.55–1.02)
DIFFERENTIAL METHOD BLD: ABNORMAL
EKG ATRIAL RATE: 59 BPM
EKG DIAGNOSIS: NORMAL
EKG P AXIS: 64 DEGREES
EKG P-R INTERVAL: 140 MS
EKG Q-T INTERVAL: 410 MS
EKG QRS DURATION: 72 MS
EKG QTC CALCULATION (BAZETT): 405 MS
EKG R AXIS: 6 DEGREES
EKG T AXIS: 14 DEGREES
EKG VENTRICULAR RATE: 59 BPM
EOSINOPHIL # BLD: 0.1 K/UL (ref 0–0.4)
EOSINOPHIL NFR BLD: 1 % (ref 0–7)
ERYTHROCYTE [DISTWIDTH] IN BLOOD BY AUTOMATED COUNT: 12.6 % (ref 11.5–14.5)
GLOBULIN SER CALC-MCNC: 3.6 G/DL (ref 2–4)
GLUCOSE BLD STRIP.AUTO-MCNC: 99 MG/DL (ref 65–117)
GLUCOSE SERPL-MCNC: 91 MG/DL (ref 65–100)
HCT VFR BLD AUTO: 47.2 % (ref 35–47)
HGB BLD-MCNC: 15.8 G/DL (ref 11.5–16)
IMM GRANULOCYTES # BLD AUTO: 0 K/UL (ref 0–0.04)
IMM GRANULOCYTES NFR BLD AUTO: 0 % (ref 0–0.5)
INR PPP: 1 (ref 0.9–1.1)
LYMPHOCYTES # BLD: 2.6 K/UL (ref 0.8–3.5)
LYMPHOCYTES NFR BLD: 25 % (ref 12–49)
MCH RBC QN AUTO: 31.6 PG (ref 26–34)
MCHC RBC AUTO-ENTMCNC: 33.5 G/DL (ref 30–36.5)
MCV RBC AUTO: 94.4 FL (ref 80–99)
MONOCYTES # BLD: 0.6 K/UL (ref 0–1)
MONOCYTES NFR BLD: 6 % (ref 5–13)
NEUTS SEG # BLD: 6.9 K/UL (ref 1.8–8)
NEUTS SEG NFR BLD: 67 % (ref 32–75)
NRBC # BLD: 0 K/UL (ref 0–0.01)
NRBC BLD-RTO: 0 PER 100 WBC
PLATELET # BLD AUTO: 259 K/UL (ref 150–400)
PMV BLD AUTO: 11.6 FL (ref 8.9–12.9)
POTASSIUM SERPL-SCNC: 4.3 MMOL/L (ref 3.5–5.1)
PROT SERPL-MCNC: 7.1 G/DL (ref 6.4–8.2)
PROTHROMBIN TIME: 9.8 SEC (ref 9–11.1)
RBC # BLD AUTO: 5 M/UL (ref 3.8–5.2)
SERVICE CMNT-IMP: NORMAL
SODIUM SERPL-SCNC: 140 MMOL/L (ref 136–145)
TROPONIN I SERPL HS-MCNC: <4 NG/L (ref 0–51)
WBC # BLD AUTO: 10.3 K/UL (ref 3.6–11)

## 2024-12-27 PROCEDURE — 85025 COMPLETE CBC W/AUTO DIFF WBC: CPT

## 2024-12-27 PROCEDURE — 85610 PROTHROMBIN TIME: CPT

## 2024-12-27 PROCEDURE — 70498 CT ANGIOGRAPHY NECK: CPT

## 2024-12-27 PROCEDURE — 82962 GLUCOSE BLOOD TEST: CPT

## 2024-12-27 PROCEDURE — 2580000003 HC RX 258: Performed by: EMERGENCY MEDICINE

## 2024-12-27 PROCEDURE — 70450 CT HEAD/BRAIN W/O DYE: CPT

## 2024-12-27 PROCEDURE — 84484 ASSAY OF TROPONIN QUANT: CPT

## 2024-12-27 PROCEDURE — 6370000000 HC RX 637 (ALT 250 FOR IP): Performed by: EMERGENCY MEDICINE

## 2024-12-27 PROCEDURE — 80053 COMPREHEN METABOLIC PANEL: CPT

## 2024-12-27 PROCEDURE — 0042T CT BRAIN PERFUSION: CPT

## 2024-12-27 PROCEDURE — 36415 COLL VENOUS BLD VENIPUNCTURE: CPT

## 2024-12-27 PROCEDURE — 6360000004 HC RX CONTRAST MEDICATION: Performed by: EMERGENCY MEDICINE

## 2024-12-27 PROCEDURE — 99285 EMERGENCY DEPT VISIT HI MDM: CPT

## 2024-12-27 RX ORDER — ASPIRIN 325 MG
325 TABLET ORAL
Status: CANCELLED | OUTPATIENT
Start: 2024-12-27 | End: 2024-12-27

## 2024-12-27 RX ORDER — IOPAMIDOL 755 MG/ML
100 INJECTION, SOLUTION INTRAVASCULAR ONCE
Status: COMPLETED | OUTPATIENT
Start: 2024-12-27 | End: 2024-12-27

## 2024-12-27 RX ORDER — ASPIRIN 81 MG/1
81 TABLET ORAL
Status: COMPLETED | OUTPATIENT
Start: 2024-12-27 | End: 2024-12-27

## 2024-12-27 RX ORDER — SODIUM CHLORIDE 9 MG/ML
INJECTION, SOLUTION INTRAVENOUS CONTINUOUS
Status: DISCONTINUED | OUTPATIENT
Start: 2024-12-27 | End: 2024-12-27 | Stop reason: HOSPADM

## 2024-12-27 RX ORDER — MECLIZINE HYDROCHLORIDE 25 MG/1
TABLET ORAL
COMMUNITY

## 2024-12-27 RX ADMIN — ASPIRIN 81 MG: 81 TABLET, COATED ORAL at 16:30

## 2024-12-27 RX ADMIN — IOPAMIDOL 100 ML: 755 INJECTION, SOLUTION INTRAVENOUS at 15:51

## 2024-12-27 RX ADMIN — SODIUM CHLORIDE: 9 INJECTION, SOLUTION INTRAVENOUS at 16:30

## 2024-12-27 ASSESSMENT — PAIN - FUNCTIONAL ASSESSMENT
PAIN_FUNCTIONAL_ASSESSMENT: 0-10
PAIN_FUNCTIONAL_ASSESSMENT: NONE - DENIES PAIN

## 2024-12-27 ASSESSMENT — LIFESTYLE VARIABLES
HOW OFTEN DO YOU HAVE A DRINK CONTAINING ALCOHOL: NEVER
HOW MANY STANDARD DRINKS CONTAINING ALCOHOL DO YOU HAVE ON A TYPICAL DAY: PATIENT DOES NOT DRINK

## 2024-12-27 ASSESSMENT — PAIN SCALES - GENERAL: PAINLEVEL_OUTOF10: 0

## 2024-12-27 NOTE — ED NOTES
Discussed with the pt and  the risks of signing out AMA for admission. Educated pt in regards to signs and symptoms of stroke and encouraged to return to ED immediately. Pt and  verbalized understanding.

## 2024-12-27 NOTE — DISCHARGE INSTRUCTIONS
You came to the emergency department today with dizziness and blurred vision.  This can sometimes be a sign of a stroke.  The best way to diagnose a stroke is an MRI. You have decided to leave against the advice of the ER doctor and the neurologist who recommend you have this test while in the hospital.  You will receive a call about scheduling the MRI as an outpatient.      Please come back to the emergency department immediately if you have any new or worsening symptoms or if you change your mind and wish to be admitted.

## 2024-12-27 NOTE — ED TRIAGE NOTES
Pt arrived with complaint of vertigo.  Pt report that around 1 pm she ate a [employer] sandwich and she developed dizziness with hx of vertigo, blurry vision with nausea and headache behind her eyes.  Pt reports she tried some peppermint candy thinking her headache was from sinus pressure and she thought she took her vertigo medication but mistakenly took her GERD medication.  Pt reports the blurry vision is better but still is dizzy.  B-FAST negative in triage. Pt ambulated with a slow steady gait to room .  Pt speaking in full complete sentences  NAD.  Pt educated on ER flow

## 2024-12-28 NOTE — ED PROVIDER NOTES
as normal.  I discussed results with patient.  We discussed the limitations of CT scan and assessing posterior circulation stroke and the need for MRI.  I advised her that I am concerned her symptoms were related to a TIA.  I also advised the patient that it is the neurologist recommendation as well as mine that she obtain a further workup in the hospital.  Because there is no MRI at [facility] on weekends, patient would either require admission over the weekend waiting for MRI on Monday or transfer for further testing.    After discussion with patient's partner, she declines admission.  She understands that she is leaving against my advice and prior to complete workup to rule out stroke.  She was provided with outpatient MRI follow-up form.  She has a PCP follow-up next week already scheduled.      NIHSS: 0      FINAL IMPRESSION     1. Dizziness          DISPOSITION/PLAN   Cece Quinn's  results have been reviewed with her.  She has been counseled regarding her diagnosis, treatment, and plan.  She verbally conveys understanding and agreement of the signs, symptoms, diagnosis, treatment and prognosis and additionally agrees to follow up as discussed.  She also agrees with the care-plan and conveys that all of her questions have been answered.  I have also provided discharge instructions for her that include: educational information regarding their diagnosis and treatment, and list of reasons why they would want to return to the ED prior to their follow-up appointment, should her condition change.     CLINICAL IMPRESSION    AMA     PATIENT REFERRED TO:  Josselin Baires, JOSH - NP  [address] [facility] [id number] [phone]      as scheduled    [facility] EMERGENCY DEP  [address] [address] [phone]    If symptoms worsen       DISCHARGE MEDICATIONS:     Medication List        ASK your doctor about these medications      chlorzoxazone 500 MG tablet  Commonly known as: PARAFON FORTE  Take 1 tablet

## 2024-12-30 ENCOUNTER — TELEPHONE (OUTPATIENT)
Age: 59
End: 2024-12-30

## 2024-12-30 ENCOUNTER — TRANSCRIBE ORDERS (OUTPATIENT)
Facility: HOSPITAL | Age: 59
End: 2024-12-30

## 2024-12-30 DIAGNOSIS — R42 DIZZINESS: Primary | ICD-10-CM

## 2024-12-30 LAB
EKG ATRIAL RATE: 59 BPM
EKG DIAGNOSIS: NORMAL
EKG P AXIS: 64 DEGREES
EKG P-R INTERVAL: 140 MS
EKG Q-T INTERVAL: 410 MS
EKG QRS DURATION: 72 MS
EKG QTC CALCULATION (BAZETT): 405 MS
EKG R AXIS: 6 DEGREES
EKG T AXIS: 14 DEGREES
EKG VENTRICULAR RATE: 59 BPM

## 2024-12-30 NOTE — PROGRESS NOTES
Assessment/ Plan:       ASSESSMENT AND PLAN    Vertigo  She feels this is related to her sinuses.  Recommend continuing the Singulair and trying to move to a location with better air quality.    She does feel the dizziness has been more debilitating over the last year.  She plans to schedule the MRI of her brain in the next week and would like to do a neurology referral for recommendation. Referral placed.   Discussed returning to the ER for worsening dizziness    CTA brain from 12/27/2024 reviewed which was unremarkable.      CBC and CMP from 12/27/2024 reviewed which were unremarkable  Due for another lipid panel for her hypercholesterolemia.   Lab Results   Component Value Date    CHOL 222 (H) 09/07/2022    TRIG 203 (H) 09/07/2022    HDL 37 09/07/2022    .4 (H) 09/07/2022    VLDL 40.6 09/07/2022    CHOLHDLRATIO 6.0 (H) 09/07/2022        HM  Flu shot: Admin today.    Return in about 6 months (around 6/30/2025).       Subjective:  Cece Quinn is a 59 y.o. female here today for routine check up and discuss recent ER visit.  Chief Complaint   Patient presents with    Follow-up     Vertigo  Needs cholesterol checked  Wants flu shot   She was recently seen in the ER on 12/27/2024 for dizziness. Hx vertigo, but felt different. She had a CTA and CT head which were normal.  She was advised to continue with an MRI.  She was advised to stay for further testing but requested to leave AMA and follow-up outpatient.  MRI has been ordered.    Today, she still has occasional dizziness.  This has been ongoing for 1 year.  She tried to schedule her MRI of her head that was ordered by the ER physician and was told this would be around January 8, 2025 due to insurance.  She denies upper or lower extremity weakness.  Denies facial weakness.  She would like to be seen by neurology. She does mention that the house she is currently staying in has some air quality issues.    Therefore she feels that her sinuses could be

## 2024-12-31 ENCOUNTER — OFFICE VISIT (OUTPATIENT)
Age: 59
End: 2024-12-31
Payer: COMMERCIAL

## 2024-12-31 VITALS
HEIGHT: 64 IN | DIASTOLIC BLOOD PRESSURE: 70 MMHG | WEIGHT: 213.38 LBS | SYSTOLIC BLOOD PRESSURE: 108 MMHG | HEART RATE: 66 BPM | RESPIRATION RATE: 18 BRPM | BODY MASS INDEX: 36.43 KG/M2 | TEMPERATURE: 97.4 F | OXYGEN SATURATION: 97 %

## 2024-12-31 DIAGNOSIS — E78.00 HYPERCHOLESTEROLEMIA: ICD-10-CM

## 2024-12-31 DIAGNOSIS — R42 VERTIGO: Primary | ICD-10-CM

## 2024-12-31 DIAGNOSIS — Z23 ENCOUNTER FOR ADMINISTRATION OF VACCINE: ICD-10-CM

## 2024-12-31 PROCEDURE — 90661 CCIIV3 VAC ABX FR 0.5 ML IM: CPT | Performed by: NURSE PRACTITIONER

## 2024-12-31 PROCEDURE — 99214 OFFICE O/P EST MOD 30 MIN: CPT | Performed by: NURSE PRACTITIONER

## 2024-12-31 PROCEDURE — 90471 IMMUNIZATION ADMIN: CPT | Performed by: NURSE PRACTITIONER

## 2024-12-31 RX ORDER — PROGESTERONE 200 MG/1
200 CAPSULE ORAL DAILY
COMMUNITY

## 2024-12-31 SDOH — ECONOMIC STABILITY: FOOD INSECURITY: WITHIN THE PAST 12 MONTHS, THE FOOD YOU BOUGHT JUST DIDN'T LAST AND YOU DIDN'T HAVE MONEY TO GET MORE.: NEVER TRUE

## 2024-12-31 SDOH — ECONOMIC STABILITY: FOOD INSECURITY: WITHIN THE PAST 12 MONTHS, YOU WORRIED THAT YOUR FOOD WOULD RUN OUT BEFORE YOU GOT MONEY TO BUY MORE.: NEVER TRUE

## 2024-12-31 SDOH — ECONOMIC STABILITY: INCOME INSECURITY: HOW HARD IS IT FOR YOU TO PAY FOR THE VERY BASICS LIKE FOOD, HOUSING, MEDICAL CARE, AND HEATING?: NOT VERY HARD

## 2024-12-31 ASSESSMENT — ENCOUNTER SYMPTOMS
EYES NEGATIVE: 1
GASTROINTESTINAL NEGATIVE: 1
RESPIRATORY NEGATIVE: 1

## 2024-12-31 ASSESSMENT — PATIENT HEALTH QUESTIONNAIRE - PHQ9
SUM OF ALL RESPONSES TO PHQ QUESTIONS 1-9: 0
SUM OF ALL RESPONSES TO PHQ9 QUESTIONS 1 & 2: 0
2. FEELING DOWN, DEPRESSED OR HOPELESS: NOT AT ALL
SUM OF ALL RESPONSES TO PHQ QUESTIONS 1-9: 0
SUM OF ALL RESPONSES TO PHQ QUESTIONS 1-9: 0
1. LITTLE INTEREST OR PLEASURE IN DOING THINGS: NOT AT ALL
SUM OF ALL RESPONSES TO PHQ QUESTIONS 1-9: 0

## 2024-12-31 NOTE — PATIENT INSTRUCTIONS
provider.  Adverse reactions should be reported to the Vaccine Adverse Event Reporting System (VAERS). Your health care provider will usually file this report, or you can do it yourself. Visit the VAERS website at www.vaers.[facility].gov or call 1-469.380.6279. VAERS is only for reporting reactions, and VAERS staff members do not give medical advice.  The National Vaccine Injury Compensation Program  The National Vaccine Injury Compensation Program (VICP) is a federal program that was created to compensate people who may have been injured by certain vaccines. Claims regarding alleged injury or death due to vaccination have a time limit for filing, which may be as short as two years. Visit the VICP website at www.[facility].gov/vaccinecompensation or call 1-818.462.8496 to learn about the program and about filing a claim.  How can I learn more?  Ask your health care provider.  Call your local or state health department.  Visit the website of the Food and Drug Administration (FDA) for vaccine package inserts and additional information at www.fda.gov/vaccines-blood-biologics/vaccines.  Contact the Centers for Disease Control and Prevention (CDC):  Call 1-345.207.1563 (5-844-ETN-INFO) or  Visit CDC's website at www.cdc.gov/flu.  Vaccine Information Statement  Inactivated Influenza Vaccine  8/6/2021  42 U.S.C. § 300aa-26  Department of Health and Human Services  Centers for Disease Control and Prevention  Many vaccine information statements are available in [place] and other languages. See www.immunize.org/vis.  Hojas de información sobre vacunas están disponibles en español y en muchos otros idiomas. Visite www.immunize.org/vis.  Care instructions adapted under license by [employer]. If you have questions about a medical condition or this instruction, always ask your healthcare professional. [employer] disclaims any warranty or liability for your use of this information.

## 2024-12-31 NOTE — PROGRESS NOTES
Patient was administered vaccine Flu in right deltoid via IM.  Patient tolerated well.  Medication information reviewed with patient, patient states understanding. Patient to resume routine medications at home.  Patient given copy of AVS and VIIS with medication information and instructions for home. VIIS reviewed with patient and patient states understanding.

## 2024-12-31 NOTE — PROGRESS NOTES
Labs drawn in right arm per Josselin's orders. Pt tolerated well.\"Have you been to the ER, urgent care clinic since your last visit?  Hospitalized since your last visit?\"    YES - When: approximately 3 days ago.  Where and Why: ER.    “Have you seen or consulted any other health care providers outside our system since your last visit?”    YES - When: approximately 4 months ago.  Where and Why: Dr Clemons.     “Have you had a pap smear?”    YES - Where: [facility] to request most recent records if not in the chart    Date of last Cervical Cancer screen (HPV or PAP): 4/27/2021

## 2025-01-01 LAB
CHOLEST SERPL-MCNC: 252 MG/DL (ref 100–199)
HDLC SERPL-MCNC: 35 MG/DL
IMP & REVIEW OF LAB RESULTS: NORMAL
LDLC SERPL CALC-MCNC: 168 MG/DL (ref 0–99)
TRIGL SERPL-MCNC: 261 MG/DL (ref 0–149)
VLDLC SERPL CALC-MCNC: 49 MG/DL (ref 5–40)

## 2025-01-02 DIAGNOSIS — E78.00 HYPERCHOLESTEROLEMIA: Primary | ICD-10-CM

## 2025-01-02 RX ORDER — ATORVASTATIN CALCIUM 10 MG/1
10 TABLET, FILM COATED ORAL DAILY
Qty: 90 TABLET | Refills: 1 | Status: SHIPPED | OUTPATIENT
Start: 2025-01-02

## 2025-01-08 ENCOUNTER — HOSPITAL ENCOUNTER (OUTPATIENT)
Facility: HOSPITAL | Age: 60
Discharge: HOME | End: 2025-01-11
Attending: EMERGENCY MEDICINE
Payer: COMMERCIAL

## 2025-01-08 DIAGNOSIS — R42 DIZZINESS: ICD-10-CM

## 2025-01-08 PROCEDURE — 70551 MRI BRAIN STEM W/O DYE: CPT

## 2025-01-20 RX ORDER — MONTELUKAST SODIUM 10 MG/1
10 TABLET ORAL DAILY
Qty: 30 TABLET | Refills: 0 | Status: SHIPPED | OUTPATIENT
Start: 2025-01-20

## 2025-02-24 RX ORDER — MONTELUKAST SODIUM 10 MG/1
10 TABLET ORAL DAILY
Qty: 30 TABLET | Refills: 0 | Status: SHIPPED | OUTPATIENT
Start: 2025-02-24

## 2025-02-24 RX ORDER — HYDROCHLOROTHIAZIDE 25 MG/1
TABLET ORAL
Qty: 60 TABLET | Refills: 0 | Status: SHIPPED | OUTPATIENT
Start: 2025-02-24

## 2025-02-24 NOTE — TELEPHONE ENCOUNTER
Patient requesting refill on     Requested Prescriptions     Pending Prescriptions Disp Refills    montelukast (SINGULAIR) 10 MG tablet [Pharmacy Med Name: Montelukast Sodium 10 MG Oral Tablet] 30 tablet 0     Sig: Take 1 tablet by mouth once daily    hydroCHLOROthiazide (HYDRODIURIL) 25 MG tablet [Pharmacy Med Name: hydroCHLOROthiazide 25 MG Oral Tablet] 60 tablet 0     Sig: TAKE 1 TABLET BY MOUTH ONCE DAILY AS NEEDED FOR  SWELLING        Last OV 12/31/2024

## 2025-03-21 RX ORDER — MONTELUKAST SODIUM 10 MG/1
10 TABLET ORAL DAILY
Qty: 30 TABLET | Refills: 0 | Status: SHIPPED | OUTPATIENT
Start: 2025-03-21

## (undated) DEVICE — KIT CANSTR VAC TANTEM TB FOR AQUILEX FLD CTRL SYS

## (undated) DEVICE — SYR 20ML LL STRL LF --

## (undated) DEVICE — PAD,NON-ADHERENT,2X3,STERILE,LF,1/PK: Brand: MEDLINE

## (undated) DEVICE — SHEET,DRAPE,53X77,STERILE: Brand: MEDLINE

## (undated) DEVICE — DEVICE TISS REM DIA3MM L25.25IN ENDOSCP F/ IU POLYPS

## (undated) DEVICE — GLOVE ORANGE PI 7 1/2   MSG9075

## (undated) DEVICE — SOLUTION IRRIG 3000ML 0.9% SOD CHL ARTHROMATIC PLAS CONT

## (undated) DEVICE — CLEAN UP KIT: Brand: MEDLINE INDUSTRIES, INC.

## (undated) DEVICE — GOWN,SIRUS,POLYRNF,RAGLAN,XL,ST,30/CS: Brand: MEDLINE

## (undated) DEVICE — PAD,SANITARY,11 IN,MAXI,N-STRL,IND WRAP: Brand: MEDLINE

## (undated) DEVICE — GARMENT,MEDLINE,DVT,INT,THIGH,M, GEN2: Brand: MEDLINE

## (undated) DEVICE — TOWEL,OR,DSP,ST,BLUE,STD,4/PK,20PK/CS: Brand: MEDLINE

## (undated) DEVICE — NEEDLE HYPO 25GA L1.5IN BLU POLYPR HUB S STL REG BVL STR

## (undated) DEVICE — CONVERTORS UNDER BUTTOCKS DRAPE WITH FLUID CONTROL POUCH II: Brand: CONVERTORS

## (undated) DEVICE — LEGGINGS, PAIR, 33X51 XL, STERILE: Brand: MEDLINE

## (undated) DEVICE — GLOVE ORANGE PI 8   MSG9080

## (undated) DEVICE — BLUNT CANNULA: Brand: MONOJECT

## (undated) DEVICE — FLUID MGMT SYS FLUENT KIT 6/PK

## (undated) DEVICE — SET ENDOSCP SEAL HYSTEROSCOPE RIG OUTFLO CHN DISP MYOSURE

## (undated) DEVICE — PERI/GYN PACK: Brand: CONVERTORS

## (undated) DEVICE — GAUZE,SPONGE,4"X4",16PLY,XRAY,STRL,LF: Brand: MEDLINE

## (undated) DEVICE — BLANKET WRM W29.9XL79.1IN UP BODY FORC AIR MISTRAL-AIR

## (undated) DEVICE — FORCEPS ENDOSCP BX L230CM DIA2.8MM ALGTR CUP SPEC RETRV GI

## (undated) DEVICE — SYRINGE MED 10ML TRNSLUC BRL PLUNG BLK MRK POLYPR CTRL

## (undated) DEVICE — ENDO CARRY-ON PROCEDURE KIT INCLUDES ENZYMATIC SPONGE, GAUZE, BIOHAZARD LABEL, TRAY, LUBRICANT, DIRTY SCOPE LABEL, WATER LABEL, TRAY, DRAWSTRING PAD, AND DEFENDO 4-PIECE KIT.: Brand: ENDO CARRY-ON PROCEDURE KIT

## (undated) DEVICE — NEEDLE HYPO 18GA L1.5IN PNK POLYPR HUB S STL REG BVL STR

## (undated) DEVICE — MARKER,SKIN,WI/RULER AND LABELS: Brand: MEDLINE

## (undated) DEVICE — Z DISCONTINUED USE 2751540 TUBING IRRIG L10IN DISP PMP ENDOGATOR

## (undated) DEVICE — VAGINAL PREP TRAY: Brand: MEDLINE INDUSTRIES, INC.

## (undated) DEVICE — COUNTER NDL 30 COUNT DBL MAG